# Patient Record
Sex: FEMALE | Race: WHITE | Employment: OTHER | URBAN - METROPOLITAN AREA
[De-identification: names, ages, dates, MRNs, and addresses within clinical notes are randomized per-mention and may not be internally consistent; named-entity substitution may affect disease eponyms.]

---

## 2021-11-19 ENCOUNTER — NURSE NAVIGATOR ENCOUNTER (OUTPATIENT)
Dept: HEMATOLOGY/ONCOLOGY | Facility: HOSPITAL | Age: 71
End: 2021-11-19

## 2021-11-19 NOTE — PROGRESS NOTES
Lung     Patient called, she had a CT 11/19 that showed a nodule in her lung, she and a repeat 11/21 that showed GGO in RLL that was increased in size from 0.6 cm to 1.1 cm.      Her  was treated by Dr Temi More for his cancer and they want to be seen an

## 2021-11-29 ENCOUNTER — TELEPHONE (OUTPATIENT)
Dept: HEMATOLOGY/ONCOLOGY | Facility: HOSPITAL | Age: 71
End: 2021-11-29

## 2021-12-01 ENCOUNTER — TELEPHONE (OUTPATIENT)
Dept: HEMATOLOGY/ONCOLOGY | Facility: HOSPITAL | Age: 71
End: 2021-12-01

## 2021-12-01 ENCOUNTER — APPOINTMENT (OUTPATIENT)
Dept: HEMATOLOGY/ONCOLOGY | Facility: HOSPITAL | Age: 71
End: 2021-12-01
Attending: INTERNAL MEDICINE
Payer: MEDICARE

## 2021-12-01 DIAGNOSIS — R91.8 GROUND GLASS OPACITY PRESENT ON IMAGING OF LUNG: Primary | ICD-10-CM

## 2021-12-01 NOTE — TELEPHONE ENCOUNTER
Imaging reviewed at lung conference. Recommend biopsy of growing RLL GGO. PET will not be helpful as not likely to be positive. She verbalized understanding. Will arrange through IR.

## 2021-12-02 RX ORDER — MELOXICAM 15 MG/1
7.5 TABLET ORAL DAILY
COMMUNITY

## 2021-12-02 RX ORDER — ACETAMINOPHEN 500 MG
500 TABLET ORAL EVERY 4 HOURS PRN
COMMUNITY

## 2021-12-02 RX ORDER — PANTOPRAZOLE SODIUM 40 MG/1
40 TABLET, DELAYED RELEASE ORAL
COMMUNITY
End: 2021-12-22

## 2021-12-02 RX ORDER — ATENOLOL 25 MG/1
25 TABLET ORAL DAILY
COMMUNITY

## 2021-12-02 RX ORDER — GARLIC EXTRACT 500 MG
1 CAPSULE ORAL DAILY
COMMUNITY

## 2021-12-02 RX ORDER — ALBUTEROL SULFATE 90 UG/1
AEROSOL, METERED RESPIRATORY (INHALATION) EVERY 6 HOURS PRN
COMMUNITY

## 2021-12-02 RX ORDER — FOLIC ACID 1 MG/1
TABLET ORAL DAILY
COMMUNITY

## 2021-12-02 RX ORDER — ALPRAZOLAM 0.25 MG/1
0.25 TABLET ORAL 4 TIMES DAILY PRN
COMMUNITY

## 2021-12-02 RX ORDER — PRAVASTATIN SODIUM 40 MG
40 TABLET ORAL NIGHTLY
COMMUNITY

## 2021-12-06 ENCOUNTER — TELEPHONE (OUTPATIENT)
Dept: HEMATOLOGY/ONCOLOGY | Facility: HOSPITAL | Age: 71
End: 2021-12-06

## 2021-12-06 NOTE — TELEPHONE ENCOUNTER
Belen Huddleston from radiology called and said that Lori Fuentes ordered a lung biopsy for patient, however Hilary/Radiology said that the doctor did not do a history and physical. The appointment for the biopsy is at 10:00 a.m.and the [place] is at 2:00 p.m.

## 2021-12-06 NOTE — IMAGING NOTE
Called primary, Dose, Ami NP's office, pt needs History and physical done before procedure on 12/08/21, spoke with Nurse Krysta from office. Awaiting response.

## 2021-12-08 ENCOUNTER — HOSPITAL ENCOUNTER (OUTPATIENT)
Dept: GENERAL RADIOLOGY | Facility: HOSPITAL | Age: 71
Discharge: HOME | End: 2021-12-08
Attending: RADIOLOGY
Payer: MEDICARE

## 2021-12-08 ENCOUNTER — HOSPITAL ENCOUNTER (OUTPATIENT)
Dept: CT IMAGING | Facility: HOSPITAL | Age: 71
Discharge: HOME | End: 2021-12-08
Attending: INTERNAL MEDICINE
Payer: MEDICARE

## 2021-12-08 ENCOUNTER — LAB ENCOUNTER (OUTPATIENT)
Dept: LAB | Facility: HOSPITAL | Age: 71
End: 2021-12-08
Attending: INTERNAL MEDICINE
Payer: MEDICARE

## 2021-12-08 VITALS
TEMPERATURE: 97 F | OXYGEN SATURATION: 98 % | BODY MASS INDEX: 34.07 KG/M2 | RESPIRATION RATE: 16 BRPM | WEIGHT: 212 LBS | DIASTOLIC BLOOD PRESSURE: 69 MMHG | HEART RATE: 63 BPM | HEIGHT: 66 IN | SYSTOLIC BLOOD PRESSURE: 116 MMHG

## 2021-12-08 DIAGNOSIS — R91.8 GROUND GLASS OPACITY PRESENT ON IMAGING OF LUNG: ICD-10-CM

## 2021-12-08 DIAGNOSIS — R91.8 GROUND GLASS OPACITY PRESENT ON IMAGING OF LUNG: Primary | ICD-10-CM

## 2021-12-08 PROCEDURE — 87116 MYCOBACTERIA CULTURE: CPT | Performed by: INTERNAL MEDICINE

## 2021-12-08 PROCEDURE — 87070 CULTURE OTHR SPECIMN AEROBIC: CPT | Performed by: INTERNAL MEDICINE

## 2021-12-08 PROCEDURE — 99152 MOD SED SAME PHYS/QHP 5/>YRS: CPT | Performed by: INTERNAL MEDICINE

## 2021-12-08 PROCEDURE — 87205 SMEAR GRAM STAIN: CPT | Performed by: INTERNAL MEDICINE

## 2021-12-08 PROCEDURE — 85027 COMPLETE CBC AUTOMATED: CPT | Performed by: RADIOLOGY

## 2021-12-08 PROCEDURE — 87206 SMEAR FLUORESCENT/ACID STAI: CPT | Performed by: INTERNAL MEDICINE

## 2021-12-08 PROCEDURE — 87075 CULTR BACTERIA EXCEPT BLOOD: CPT | Performed by: INTERNAL MEDICINE

## 2021-12-08 PROCEDURE — 87102 FUNGUS ISOLATION CULTURE: CPT | Performed by: INTERNAL MEDICINE

## 2021-12-08 PROCEDURE — 85610 PROTHROMBIN TIME: CPT

## 2021-12-08 PROCEDURE — 88321 CONSLTJ&REPRT SLD PREP ELSWR: CPT | Performed by: INTERNAL MEDICINE

## 2021-12-08 PROCEDURE — 87176 TISSUE HOMOGENIZATION CULTR: CPT | Performed by: INTERNAL MEDICINE

## 2021-12-08 PROCEDURE — 71045 X-RAY EXAM CHEST 1 VIEW: CPT | Performed by: RADIOLOGY

## 2021-12-08 PROCEDURE — 32408 CORE NDL BX LNG/MED PERQ: CPT | Performed by: INTERNAL MEDICINE

## 2021-12-08 PROCEDURE — 36415 COLL VENOUS BLD VENIPUNCTURE: CPT

## 2021-12-08 PROCEDURE — 88305 TISSUE EXAM BY PATHOLOGIST: CPT | Performed by: INTERNAL MEDICINE

## 2021-12-08 RX ORDER — MIDAZOLAM HYDROCHLORIDE 1 MG/ML
1 INJECTION INTRAMUSCULAR; INTRAVENOUS EVERY 5 MIN PRN
Status: DISCONTINUED | OUTPATIENT
Start: 2021-12-08 | End: 2021-12-10

## 2021-12-08 RX ORDER — NALOXONE HYDROCHLORIDE 0.4 MG/ML
80 INJECTION, SOLUTION INTRAMUSCULAR; INTRAVENOUS; SUBCUTANEOUS AS NEEDED
Status: DISCONTINUED | OUTPATIENT
Start: 2021-12-08 | End: 2021-12-10

## 2021-12-08 RX ORDER — MIDAZOLAM HYDROCHLORIDE 1 MG/ML
INJECTION INTRAMUSCULAR; INTRAVENOUS
Status: COMPLETED
Start: 2021-12-08 | End: 2021-12-08

## 2021-12-08 RX ORDER — SODIUM CHLORIDE 9 MG/ML
INJECTION, SOLUTION INTRAVENOUS CONTINUOUS
Status: DISCONTINUED | OUTPATIENT
Start: 2021-12-08 | End: 2021-12-10

## 2021-12-08 RX ORDER — FLUMAZENIL 0.1 MG/ML
0.2 INJECTION, SOLUTION INTRAVENOUS AS NEEDED
Status: DISCONTINUED | OUTPATIENT
Start: 2021-12-08 | End: 2021-12-10

## 2021-12-08 RX ADMIN — SODIUM CHLORIDE: 9 INJECTION, SOLUTION INTRAVENOUS at 10:37:00

## 2021-12-08 RX ADMIN — MIDAZOLAM HYDROCHLORIDE 1 MG: 1 INJECTION INTRAMUSCULAR; INTRAVENOUS at 11:17:00

## 2021-12-08 NOTE — PROCEDURES
[facility]  Procedure Note    Darrius Maria Patient Status:  Outpatient    10/8/1950 MRN [id number]   [facility] CT Attending Kuldeep Troy MD   Hosp Day # 0 PCP Ami M Dose     Procedure: CT guided lung biopsy R    Pre-Proce

## 2021-12-08 NOTE — H&P
[place] Patient Status:  Outpatient    10/8/1950 MRN [id number]   [facility] CT Attending Jett Blair MD   Hosp Day # 0 PCP Ami M Dose     Admitting Diagnosis:   GGO lung on R    Hi mg by mouth every morning before breakfast., Disp: , Rfl:   •  ALPRAZolam 0.25 MG Oral Tab, Take 0.25 mg by mouth 4 (four) times daily as needed. , Disp: , Rfl:     Physical Exam & Review of Systems:   Physical Exam:    /79 (BP Location: Left arm)   P

## 2021-12-08 NOTE — IMAGING NOTE
[employer], name, date of birth and allergies verified with pt. Pt here for R lung biopsy. States NPO since before midnight. Pre procedure vitals checked. IV access established to myself. saline lock. 0.9 NS IV initiated to maintain patency.   Inf

## 2021-12-09 ENCOUNTER — TELEPHONE (OUTPATIENT)
Dept: HEMATOLOGY/ONCOLOGY | Facility: HOSPITAL | Age: 71
End: 2021-12-09

## 2021-12-09 NOTE — TELEPHONE ENCOUNTER
Patient calling and missed her consult yesterday 12/8/2021   As she was still at the Hospital  Getting the Biopsy. Asking when she ca come in the that visit. Also asking if it will be Dr Virgil Orr who will be calling her with the Biopsy results.

## 2021-12-10 ENCOUNTER — TELEPHONE (OUTPATIENT)
Dept: HEMATOLOGY/ONCOLOGY | Facility: HOSPITAL | Age: 71
End: 2021-12-10

## 2021-12-10 NOTE — TELEPHONE ENCOUNTER
Patient calling to see if the biopsy results had come back.    If and when they do  Please call her at her home number

## 2021-12-13 ENCOUNTER — TELEPHONE (OUTPATIENT)
Dept: HEMATOLOGY/ONCOLOGY | Facility: HOSPITAL | Age: 71
End: 2021-12-13

## 2021-12-15 ENCOUNTER — APPOINTMENT (OUTPATIENT)
Dept: HEMATOLOGY/ONCOLOGY | Facility: HOSPITAL | Age: 71
End: 2021-12-15
Attending: INTERNAL MEDICINE
Payer: MEDICARE

## 2021-12-22 ENCOUNTER — OFFICE VISIT (OUTPATIENT)
Dept: HEMATOLOGY/ONCOLOGY | Facility: HOSPITAL | Age: 71
End: 2021-12-22
Attending: INTERNAL MEDICINE
Payer: MEDICARE

## 2021-12-22 ENCOUNTER — TELEPHONE (OUTPATIENT)
Dept: HEMATOLOGY/ONCOLOGY | Facility: HOSPITAL | Age: 71
End: 2021-12-22

## 2021-12-22 VITALS
OXYGEN SATURATION: 97 % | SYSTOLIC BLOOD PRESSURE: 157 MMHG | RESPIRATION RATE: 18 BRPM | HEART RATE: 62 BPM | WEIGHT: 212.38 LBS | HEIGHT: 64.06 IN | BODY MASS INDEX: 36.26 KG/M2 | DIASTOLIC BLOOD PRESSURE: 77 MMHG | TEMPERATURE: 97 F

## 2021-12-22 DIAGNOSIS — R05.3 CHRONIC COUGH: ICD-10-CM

## 2021-12-22 DIAGNOSIS — C34.91 BRONCHOGENIC CANCER OF RIGHT LUNG (HCC): Primary | ICD-10-CM

## 2021-12-22 DIAGNOSIS — C34.82 MALIGNANT NEOPLASM OF OVERLAPPING SITES OF LEFT BRONCHUS AND LUNG (HCC): ICD-10-CM

## 2021-12-22 PROCEDURE — 99205 OFFICE O/P NEW HI 60 MIN: CPT | Performed by: INTERNAL MEDICINE

## 2021-12-22 RX ORDER — LANSOPRAZOLE 30 MG/1
15 TABLET, ORALLY DISINTEGRATING, DELAYED RELEASE ORAL DAILY
COMMUNITY

## 2021-12-22 NOTE — PATIENT INSTRUCTIONS
For triage nurse: [phone] Monday through Friday 7:30-5:00.  *Please note this is a new phone number*    After hours or weekends for emergent needs:  [phone].      To schedule diagnostic testing: Central Scheduling: [place] [id number]

## 2021-12-22 NOTE — CONSULTS
Cancer Center Report of Consultation    Patient Name: Joshua Egan   YOB: 1950   Medical Record Number: [id number]   CSN: [id number]   Consulting Physician: Claudia Beauchamp MD  Referring Physician(s): Linda Muñoz    Date of eats and active. Denies chest or abdominal pain, change in  bowel or urinary habits, headaches, dizziness or visual symptoms. No hoarseness or hemoptysis.        Case Report   Surgical Pathology                                Case: [id number]                 right lower lobe on chest CT in November of 2019. A repeat chest CT performed on November of 2021 showed a ground-glass opacity in right upper lobe that had increased in size from 0.6 cm to 1.1 cm.  She subsequently underwent biopsy of said lesion.     Sect Surgical History:  Past Surgical History:   Procedure Laterality Date   • BREAST BIOPSY     • OTHER      carpal tunnel   • TOTAL KNEE REPLACEMENT         Family Medical History:  Family History   Problem Relation Age of Onset   • Cancer Brother    • Cancer mouth daily. , Disp: , Rfl:   •  albuterol 108 (90 Base) MCG/ACT Inhalation Aero Soln, Inhale into the lungs every 6 (six) hours as needed for Wheezing., Disp: , Rfl:   •  Meloxicam 15 MG Oral Tab, Take 7.5 mg by mouth daily. , Disp: , Rfl:   •  atenolol 25 demonstrate stability from the November 2019 prior. Stability suggests benignity. 2. There is, however, a groundglass nodule in the superior segment of the right lower lobe which is increased from prior, currently 1.1 cm in size, previously 0.6 cm.  Valery Gutiérrez partially included and noted on previous. Atherosclerosis of the abdominal aorta. Bones: No destructive lesions. Degenerative changes of the spine.    .    Procedure Note    Jimmie Nettles MD - 11/18/2021   Formatting of this note might be di benignity. 2. There is, however, a groundglass nodule in the superior segment of the right lower lobe which is increased from prior, currently 1.1 cm in size, previously 0.6 cm. This is indeterminate.  Possibility for indolent neoplasm such as bronchoal with possible prior outside imaging is recommended, if available. Otherwise, as a precaution, follow-up chest CT in one year is recommended to monitor this finding.     2.  No evidence of acute intrathoracic process.  No specific findings on this study to a 2.  No evidence of acute intrathoracic process. No specific findings on this study to account for the patient's symptoms.   Specimen Collected: 11/30/19  1:14 PM Last Resulted: 11/30/19  4:44 PM   Received From: [facility]  Result Received: 12/01/21

## 2021-12-30 ENCOUNTER — TELEPHONE (OUTPATIENT)
Dept: HEMATOLOGY/ONCOLOGY | Facility: HOSPITAL | Age: 71
End: 2021-12-30

## 2021-12-30 NOTE — TELEPHONE ENCOUNTER
Spoke with pt she is concerned that the surgeon will not see her without PFT being completed. Pt also called [facility] and they are also not completing PFTs at this time. Explained to pt to complete PET scan and will notify results.  Explained to pt that its p

## 2021-12-30 NOTE — TELEPHONE ENCOUNTER
Patient calling says her PF test was canceled. She does not know what to do.  Please reach out to her. Jamie Wilson

## 2021-12-30 NOTE — TELEPHONE ENCOUNTER
Corinna from [facility] called to inform Dr. Ankita Milner that all pulmonary functions test/procedures are hold for now. Patient has been advised. Please reach out to the patient to advise next step.  Thank you

## 2022-01-11 ENCOUNTER — LAB ENCOUNTER (OUTPATIENT)
Dept: LAB | Facility: HOSPITAL | Age: 72
End: 2022-01-11
Attending: INTERNAL MEDICINE
Payer: MEDICARE

## 2022-01-11 DIAGNOSIS — R91.8 GROUND GLASS OPACITY PRESENT ON IMAGING OF LUNG: ICD-10-CM

## 2022-01-12 LAB — SARS-COV-2 RNA RESP QL NAA+PROBE: NOT DETECTED

## 2022-01-13 ENCOUNTER — RT VISIT (OUTPATIENT)
Dept: RESPIRATORY THERAPY | Facility: HOSPITAL | Age: 72
End: 2022-01-13
Attending: INTERNAL MEDICINE
Payer: MEDICARE

## 2022-01-13 ENCOUNTER — HOSPITAL ENCOUNTER (OUTPATIENT)
Dept: NUCLEAR MEDICINE | Facility: HOSPITAL | Age: 72
Discharge: HOME | End: 2022-01-13
Attending: INTERNAL MEDICINE
Payer: MEDICARE

## 2022-01-13 DIAGNOSIS — C34.91 BRONCHOGENIC CANCER OF RIGHT LUNG (HCC): ICD-10-CM

## 2022-01-13 DIAGNOSIS — C34.82 MALIGNANT NEOPLASM OF OVERLAPPING SITES OF LEFT BRONCHUS AND LUNG (HCC): ICD-10-CM

## 2022-01-13 LAB — GLUCOSE BLD-MCNC: 89 MG/DL (ref 70–99)

## 2022-01-13 PROCEDURE — 94010 BREATHING CAPACITY TEST: CPT

## 2022-01-13 PROCEDURE — 94726 PLETHYSMOGRAPHY LUNG VOLUMES: CPT

## 2022-01-13 PROCEDURE — 82962 GLUCOSE BLOOD TEST: CPT

## 2022-01-13 PROCEDURE — 94729 DIFFUSING CAPACITY: CPT

## 2022-01-13 PROCEDURE — 78815 PET IMAGE W/CT SKULL-THIGH: CPT | Performed by: INTERNAL MEDICINE

## 2022-01-23 NOTE — PROCEDURES
Findings:  FEV1 is 1.94L, 90% predicted. FVC is 2.47L, 89% predicted. FEV1/ FVC ratio is 0.79. The flow-volume loop demonstrates a normal pattern. The TLC is 4.98L, 101% predicted. The residual volume 2.46L, 118% predicted.   The diffusion capacity is

## 2022-01-26 ENCOUNTER — EKG ENCOUNTER (OUTPATIENT)
Dept: LAB | Facility: HOSPITAL | Age: 72
End: 2022-01-26
Attending: STUDENT IN AN ORGANIZED HEALTH CARE EDUCATION/TRAINING PROGRAM
Payer: MEDICARE

## 2022-01-26 ENCOUNTER — OFFICE VISIT (OUTPATIENT)
Dept: HEMATOLOGY/ONCOLOGY | Facility: HOSPITAL | Age: 72
End: 2022-01-26
Attending: OBSTETRICS & GYNECOLOGY
Payer: MEDICARE

## 2022-01-26 VITALS
BODY MASS INDEX: 36.19 KG/M2 | DIASTOLIC BLOOD PRESSURE: 78 MMHG | RESPIRATION RATE: 18 BRPM | HEIGHT: 64.06 IN | TEMPERATURE: 98 F | SYSTOLIC BLOOD PRESSURE: 137 MMHG | HEART RATE: 67 BPM | WEIGHT: 212 LBS | OXYGEN SATURATION: 96 %

## 2022-01-26 DIAGNOSIS — R91.1 NODULE OF LOWER LOBE OF RIGHT LUNG: ICD-10-CM

## 2022-01-26 DIAGNOSIS — R91.1 NODULE OF LOWER LOBE OF RIGHT LUNG: Primary | ICD-10-CM

## 2022-01-26 LAB
ANION GAP SERPL CALC-SCNC: 6 MMOL/L (ref 0–18)
ANTIBODY SCREEN: NEGATIVE
BASOPHILS # BLD AUTO: 0.07 X10(3) UL (ref 0–0.2)
BASOPHILS NFR BLD AUTO: 0.9 %
BUN BLD-MCNC: 16 MG/DL (ref 7–18)
CALCIUM BLD-MCNC: 9.3 MG/DL (ref 8.5–10.1)
CHLORIDE SERPL-SCNC: 108 MMOL/L (ref 98–112)
CO2 SERPL-SCNC: 26 MMOL/L (ref 21–32)
CREAT BLD-MCNC: 0.57 MG/DL
EOSINOPHIL # BLD AUTO: 0.1 X10(3) UL (ref 0–0.7)
EOSINOPHIL NFR BLD AUTO: 1.2 %
ERYTHROCYTE [DISTWIDTH] IN BLOOD BY AUTOMATED COUNT: 13.5 %
FASTING STATUS PATIENT QL REPORTED: NO
GLUCOSE BLD-MCNC: 95 MG/DL (ref 70–99)
HCT VFR BLD AUTO: 42.2 %
HGB BLD-MCNC: 13.7 G/DL
IMM GRANULOCYTES # BLD AUTO: 0.03 X10(3) UL (ref 0–1)
IMM GRANULOCYTES NFR BLD: 0.4 %
LYMPHOCYTES # BLD AUTO: 2.49 X10(3) UL (ref 1–4)
LYMPHOCYTES NFR BLD AUTO: 30.7 %
MCH RBC QN AUTO: 28.4 PG (ref 26–34)
MCHC RBC AUTO-ENTMCNC: 32.5 G/DL (ref 31–37)
MCV RBC AUTO: 87.4 FL
MONOCYTES # BLD AUTO: 0.55 X10(3) UL (ref 0.1–1)
NEUTROPHILS # BLD AUTO: 4.86 X10 (3) UL (ref 1.5–7.7)
NEUTROPHILS # BLD AUTO: 4.86 X10(3) UL (ref 1.5–7.7)
NEUTROPHILS NFR BLD AUTO: 60 %
OSMOLALITY SERPL CALC.SUM OF ELEC: 291 MOSM/KG (ref 275–295)
PLATELET # BLD AUTO: 274 10(3)UL (ref 150–450)
POTASSIUM SERPL-SCNC: 4.1 MMOL/L (ref 3.5–5.1)
RBC # BLD AUTO: 4.83 X10(6)UL
RH BLOOD TYPE: NEGATIVE
SODIUM SERPL-SCNC: 140 MMOL/L (ref 136–145)
WBC # BLD AUTO: 8.1 X10(3) UL (ref 4–11)

## 2022-01-26 PROCEDURE — 93005 ELECTROCARDIOGRAM TRACING: CPT

## 2022-01-26 PROCEDURE — 86901 BLOOD TYPING SEROLOGIC RH(D): CPT

## 2022-01-26 PROCEDURE — 36415 COLL VENOUS BLD VENIPUNCTURE: CPT

## 2022-01-26 PROCEDURE — 85025 COMPLETE CBC W/AUTO DIFF WBC: CPT

## 2022-01-26 PROCEDURE — 80048 BASIC METABOLIC PNL TOTAL CA: CPT

## 2022-01-26 PROCEDURE — 86900 BLOOD TYPING SEROLOGIC ABO: CPT

## 2022-01-26 PROCEDURE — 99211 OFF/OP EST MAY X REQ PHY/QHP: CPT

## 2022-01-26 PROCEDURE — 86850 RBC ANTIBODY SCREEN: CPT

## 2022-01-26 PROCEDURE — 93010 ELECTROCARDIOGRAM REPORT: CPT | Performed by: INTERNAL MEDICINE

## 2022-01-27 NOTE — CONSULTS
Thoracic Surgery H&P     Name: Nickie Ryan   Age: 66year old   Sex: female.    MRN: [id number]    Reason for Consultation: right lower lobe ground glass nodule     Consulting Physician: Dr. Lott Has    Subjective:     Chief Complaint: \" They biop BIOPSY     • OTHER      carpal tunnel   • TOTAL KNEE REPLACEMENT         Social History: Social History    Socioeconomic History      Marital status:       Spouse name: Not on file      Number of children: Not on file      Years of education: Not on Disp: , Rfl:   atenolol 25 MG Oral Tab, Take 25 mg by mouth daily. , Disp: , Rfl:   pravastatin 40 MG Oral Tab, Take 40 mg by mouth nightly., Disp: , Rfl:   PYRIDOXINE HCL OR, Take by mouth every morning., Disp: , Rfl:   folic acid 1 MG Oral Tab, Take by mo 1/13/22:   FINDINGS:     Head and neck:     There is symmetric increased FDG accumulation within the bilateral submandibular glands.  Maximum SUV on the left is 4.01 and maximum SUV on the right is 3.72.  A discrete submandibular gland mass is not identifi degenerative and arthritic change.         CT Biopsy 12/8/21:   Lung, right, core needle biopsy ([id number]; 12/08/2021): Atypical alveolar proliferation, favor well differentiated adenocarcinoma (see Comment).     PFT 1/13/22: FEV1 90%, DLCO 103%    Assessm situ.  I described for Ms. Osborne a minimally invasive wedge resection and mediastinal lymph node dissection. Her pulmonary function tests suggest that she has adequate lung function to undergo the proposed surgery.   I went over the alternatives (surveillan

## 2022-01-28 LAB
ATRIAL RATE: 65 BPM
P AXIS: 8 DEGREES
P-R INTERVAL: 178 MS
Q-T INTERVAL: 416 MS
QRS DURATION: 72 MS
QTC CALCULATION (BEZET): 432 MS
R AXIS: -13 DEGREES
T AXIS: 20 DEGREES
VENTRICULAR RATE: 65 BPM

## 2022-02-11 ENCOUNTER — LAB ENCOUNTER (OUTPATIENT)
Dept: LAB | Age: 72
DRG: 164 | End: 2022-02-11
Attending: STUDENT IN AN ORGANIZED HEALTH CARE EDUCATION/TRAINING PROGRAM
Payer: MEDICARE

## 2022-02-11 DIAGNOSIS — R91.1 NODULE OF LOWER LOBE OF RIGHT LUNG: ICD-10-CM

## 2022-02-12 LAB — SARS-COV-2 RNA RESP QL NAA+PROBE: NOT DETECTED

## 2022-02-14 ENCOUNTER — ANESTHESIA (OUTPATIENT)
Dept: CARDIAC SURGERY | Facility: HOSPITAL | Age: 72
DRG: 164 | End: 2022-02-14
Payer: MEDICARE

## 2022-02-14 ENCOUNTER — ANESTHESIA EVENT (OUTPATIENT)
Dept: CARDIAC SURGERY | Facility: HOSPITAL | Age: 72
DRG: 164 | End: 2022-02-14
Payer: MEDICARE

## 2022-02-14 ENCOUNTER — HOSPITAL ENCOUNTER (INPATIENT)
Facility: HOSPITAL | Age: 72
LOS: 2 days | Discharge: HOME | DRG: 164 | End: 2022-02-16
Attending: THORACIC SURGERY (CARDIOTHORACIC VASCULAR SURGERY) | Admitting: THORACIC SURGERY (CARDIOTHORACIC VASCULAR SURGERY)
Payer: MEDICARE

## 2022-02-14 ENCOUNTER — HOSPITAL ENCOUNTER (OUTPATIENT)
Dept: CT IMAGING | Facility: HOSPITAL | Age: 72
Discharge: HOME | DRG: 164 | End: 2022-02-14
Attending: STUDENT IN AN ORGANIZED HEALTH CARE EDUCATION/TRAINING PROGRAM
Payer: MEDICARE

## 2022-02-14 DIAGNOSIS — Z20.822 ENCOUNTER FOR PREOPERATIVE SCREENING LABORATORY TESTING FOR COVID-19 VIRUS: Primary | ICD-10-CM

## 2022-02-14 DIAGNOSIS — R91.1 NODULE OF LOWER LOBE OF RIGHT LUNG: ICD-10-CM

## 2022-02-14 DIAGNOSIS — G47.33 OBSTRUCTIVE SLEEP APNEA SYNDROME: ICD-10-CM

## 2022-02-14 DIAGNOSIS — Z01.812 ENCOUNTER FOR PREOPERATIVE SCREENING LABORATORY TESTING FOR COVID-19 VIRUS: Primary | ICD-10-CM

## 2022-02-14 LAB
BASE EXCESS BLD CALC-SCNC: 2 MMOL/L
CA-I BLD-SCNC: 1.22 MMOL/L (ref 1.12–1.32)
CO2 BLD-SCNC: 29 MMOL/L (ref 22–32)
GLUCOSE BLD-MCNC: 99 MG/DL (ref 70–99)
HCO3 BLD-SCNC: 27.6 MEQ/L
HCT VFR BLD CALC: 35 %
ISTAT ACTIVATED CLOTTING TIME: 136 SECONDS (ref 74–137)
PCO2 BLD: 52.1 MMHG
PH BLD: 7.33 [PH]
PO2 BLD: 371 MMHG
POTASSIUM BLD-SCNC: 3.6 MMOL/L (ref 3.6–5.1)
SAO2 % BLD: 100 %
SODIUM BLD-SCNC: 142 MMOL/L (ref 136–145)

## 2022-02-14 PROCEDURE — 99222 1ST HOSP IP/OBS MODERATE 55: CPT | Performed by: HOSPITALIST

## 2022-02-14 PROCEDURE — 07B74ZX EXCISION OF THORAX LYMPHATIC, PERCUTANEOUS ENDOSCOPIC APPROACH, DIAGNOSTIC: ICD-10-PCS | Performed by: THORACIC SURGERY (CARDIOTHORACIC VASCULAR SURGERY)

## 2022-02-14 PROCEDURE — 3E0T3BZ INTRODUCTION OF ANESTHETIC AGENT INTO PERIPHERAL NERVES AND PLEXI, PERCUTANEOUS APPROACH: ICD-10-PCS | Performed by: THORACIC SURGERY (CARDIOTHORACIC VASCULAR SURGERY)

## 2022-02-14 PROCEDURE — 0BBF4ZZ EXCISION OF RIGHT LOWER LUNG LOBE, PERCUTANEOUS ENDOSCOPIC APPROACH: ICD-10-PCS | Performed by: THORACIC SURGERY (CARDIOTHORACIC VASCULAR SURGERY)

## 2022-02-14 PROCEDURE — 71250 CT THORAX DX C-: CPT | Performed by: STUDENT IN AN ORGANIZED HEALTH CARE EDUCATION/TRAINING PROGRAM

## 2022-02-14 PROCEDURE — 76942 ECHO GUIDE FOR BIOPSY: CPT | Performed by: ANESTHESIOLOGY

## 2022-02-14 PROCEDURE — 0BJ08ZZ INSPECTION OF TRACHEOBRONCHIAL TREE, VIA NATURAL OR ARTIFICIAL OPENING ENDOSCOPIC: ICD-10-PCS | Performed by: THORACIC SURGERY (CARDIOTHORACIC VASCULAR SURGERY)

## 2022-02-14 RX ORDER — HYDROMORPHONE HYDROCHLORIDE 1 MG/ML
0.8 INJECTION, SOLUTION INTRAMUSCULAR; INTRAVENOUS; SUBCUTANEOUS EVERY 2 HOUR PRN
Status: DISCONTINUED | OUTPATIENT
Start: 2022-02-14 | End: 2022-02-16

## 2022-02-14 RX ORDER — METOCLOPRAMIDE HYDROCHLORIDE 5 MG/ML
INJECTION INTRAMUSCULAR; INTRAVENOUS AS NEEDED
Status: DISCONTINUED | OUTPATIENT
Start: 2022-02-14 | End: 2022-02-14 | Stop reason: SURG

## 2022-02-14 RX ORDER — HYDROMORPHONE HYDROCHLORIDE 1 MG/ML
0.4 INJECTION, SOLUTION INTRAMUSCULAR; INTRAVENOUS; SUBCUTANEOUS EVERY 2 HOUR PRN
Status: DISCONTINUED | OUTPATIENT
Start: 2022-02-14 | End: 2022-02-16

## 2022-02-14 RX ORDER — CEFAZOLIN SODIUM/WATER 2 G/20 ML
SYRINGE (ML) INTRAVENOUS AS NEEDED
Status: DISCONTINUED | OUTPATIENT
Start: 2022-02-14 | End: 2022-02-14 | Stop reason: SURG

## 2022-02-14 RX ORDER — ACETAMINOPHEN 10 MG/ML
INJECTION, SOLUTION INTRAVENOUS AS NEEDED
Status: DISCONTINUED | OUTPATIENT
Start: 2022-02-14 | End: 2022-02-14 | Stop reason: SURG

## 2022-02-14 RX ORDER — ONDANSETRON 2 MG/ML
4 INJECTION INTRAMUSCULAR; INTRAVENOUS AS NEEDED
Status: DISCONTINUED | OUTPATIENT
Start: 2022-02-14 | End: 2022-02-14 | Stop reason: HOSPADM

## 2022-02-14 RX ORDER — HEPARIN SODIUM 5000 [USP'U]/ML
5000 INJECTION, SOLUTION INTRAVENOUS; SUBCUTANEOUS EVERY 8 HOURS SCHEDULED
Status: DISCONTINUED | OUTPATIENT
Start: 2022-02-14 | End: 2022-02-16

## 2022-02-14 RX ORDER — SODIUM CHLORIDE 0.9 % (FLUSH) 0.9 %
10 SYRINGE (ML) INJECTION AS NEEDED
Status: DISCONTINUED | OUTPATIENT
Start: 2022-02-14 | End: 2022-02-16

## 2022-02-14 RX ORDER — SODIUM CHLORIDE, SODIUM LACTATE, POTASSIUM CHLORIDE, CALCIUM CHLORIDE 600; 310; 30; 20 MG/100ML; MG/100ML; MG/100ML; MG/100ML
INJECTION, SOLUTION INTRAVENOUS CONTINUOUS
Status: DISCONTINUED | OUTPATIENT
Start: 2022-02-14 | End: 2022-02-14 | Stop reason: HOSPADM

## 2022-02-14 RX ORDER — EPHEDRINE SULFATE 50 MG/ML
INJECTION INTRAVENOUS AS NEEDED
Status: DISCONTINUED | OUTPATIENT
Start: 2022-02-14 | End: 2022-02-14 | Stop reason: SURG

## 2022-02-14 RX ORDER — CALCIUM CARBONATE 200(500)MG
1000 TABLET,CHEWABLE ORAL 3 TIMES DAILY PRN
Status: DISCONTINUED | OUTPATIENT
Start: 2022-02-14 | End: 2022-02-16

## 2022-02-14 RX ORDER — ONDANSETRON 2 MG/ML
4 INJECTION INTRAMUSCULAR; INTRAVENOUS EVERY 6 HOURS PRN
Status: DISCONTINUED | OUTPATIENT
Start: 2022-02-14 | End: 2022-02-16

## 2022-02-14 RX ORDER — FOLIC ACID 1 MG/1
1 TABLET ORAL DAILY
Status: DISCONTINUED | OUTPATIENT
Start: 2022-02-15 | End: 2022-02-16

## 2022-02-14 RX ORDER — SODIUM CHLORIDE, SODIUM LACTATE, POTASSIUM CHLORIDE, CALCIUM CHLORIDE 600; 310; 30; 20 MG/100ML; MG/100ML; MG/100ML; MG/100ML
INJECTION, SOLUTION INTRAVENOUS CONTINUOUS PRN
Status: DISCONTINUED | OUTPATIENT
Start: 2022-02-14 | End: 2022-02-14 | Stop reason: SURG

## 2022-02-14 RX ORDER — HYDROMORPHONE HYDROCHLORIDE 1 MG/ML
1.2 INJECTION, SOLUTION INTRAMUSCULAR; INTRAVENOUS; SUBCUTANEOUS EVERY 2 HOUR PRN
Status: DISCONTINUED | OUTPATIENT
Start: 2022-02-14 | End: 2022-02-16

## 2022-02-14 RX ORDER — ATORVASTATIN CALCIUM 10 MG/1
10 TABLET, FILM COATED ORAL NIGHTLY
Status: DISCONTINUED | OUTPATIENT
Start: 2022-02-14 | End: 2022-02-16

## 2022-02-14 RX ORDER — OXYCODONE HYDROCHLORIDE 5 MG/1
5 TABLET ORAL EVERY 4 HOURS PRN
Status: DISCONTINUED | OUTPATIENT
Start: 2022-02-14 | End: 2022-02-16

## 2022-02-14 RX ORDER — SODIUM CHLORIDE 9 MG/ML
INJECTION, SOLUTION INTRAVENOUS CONTINUOUS PRN
Status: DISCONTINUED | OUTPATIENT
Start: 2022-02-14 | End: 2022-02-14 | Stop reason: SURG

## 2022-02-14 RX ORDER — HYDROMORPHONE HYDROCHLORIDE 1 MG/ML
INJECTION, SOLUTION INTRAMUSCULAR; INTRAVENOUS; SUBCUTANEOUS
Status: COMPLETED
Start: 2022-02-14 | End: 2022-02-14

## 2022-02-14 RX ORDER — DIPHENHYDRAMINE HYDROCHLORIDE 50 MG/ML
12.5 INJECTION INTRAMUSCULAR; INTRAVENOUS AS NEEDED
Status: DISCONTINUED | OUTPATIENT
Start: 2022-02-14 | End: 2022-02-14 | Stop reason: HOSPADM

## 2022-02-14 RX ORDER — BISACODYL 10 MG
10 SUPPOSITORY, RECTAL RECTAL
Status: DISCONTINUED | OUTPATIENT
Start: 2022-02-14 | End: 2022-02-16

## 2022-02-14 RX ORDER — ALPRAZOLAM 0.25 MG/1
0.25 TABLET ORAL 4 TIMES DAILY PRN
Status: DISCONTINUED | OUTPATIENT
Start: 2022-02-14 | End: 2022-02-16

## 2022-02-14 RX ORDER — MIDAZOLAM HYDROCHLORIDE 1 MG/ML
1 INJECTION INTRAMUSCULAR; INTRAVENOUS EVERY 5 MIN PRN
Status: DISCONTINUED | OUTPATIENT
Start: 2022-02-14 | End: 2022-02-14 | Stop reason: HOSPADM

## 2022-02-14 RX ORDER — HYDROMORPHONE HYDROCHLORIDE 1 MG/ML
0.4 INJECTION, SOLUTION INTRAMUSCULAR; INTRAVENOUS; SUBCUTANEOUS EVERY 5 MIN PRN
Status: DISCONTINUED | OUTPATIENT
Start: 2022-02-14 | End: 2022-02-14 | Stop reason: HOSPADM

## 2022-02-14 RX ORDER — METOCLOPRAMIDE HYDROCHLORIDE 5 MG/ML
10 INJECTION INTRAMUSCULAR; INTRAVENOUS AS NEEDED
Status: DISCONTINUED | OUTPATIENT
Start: 2022-02-14 | End: 2022-02-14 | Stop reason: HOSPADM

## 2022-02-14 RX ORDER — ACETAMINOPHEN 10 MG/ML
1000 INJECTION, SOLUTION INTRAVENOUS EVERY 6 HOURS
Status: COMPLETED | OUTPATIENT
Start: 2022-02-14 | End: 2022-02-16

## 2022-02-14 RX ORDER — MEPERIDINE HYDROCHLORIDE 25 MG/ML
12.5 INJECTION INTRAMUSCULAR; INTRAVENOUS; SUBCUTANEOUS AS NEEDED
Status: DISCONTINUED | OUTPATIENT
Start: 2022-02-14 | End: 2022-02-14 | Stop reason: HOSPADM

## 2022-02-14 RX ORDER — KETOROLAC TROMETHAMINE 30 MG/ML
15 INJECTION, SOLUTION INTRAMUSCULAR; INTRAVENOUS EVERY 6 HOURS
Status: DISCONTINUED | OUTPATIENT
Start: 2022-02-14 | End: 2022-02-16

## 2022-02-14 RX ORDER — GUAIFENESIN 600 MG
600 TABLET, EXTENDED RELEASE 12 HR ORAL 2 TIMES DAILY
Status: DISCONTINUED | OUTPATIENT
Start: 2022-02-14 | End: 2022-02-16

## 2022-02-14 RX ORDER — SODIUM CHLORIDE 9 MG/ML
INJECTION, SOLUTION INTRAVENOUS CONTINUOUS
Status: DISCONTINUED | OUTPATIENT
Start: 2022-02-14 | End: 2022-02-15

## 2022-02-14 RX ORDER — ATENOLOL 25 MG/1
25 TABLET ORAL DAILY
Status: DISCONTINUED | OUTPATIENT
Start: 2022-02-15 | End: 2022-02-16

## 2022-02-14 RX ORDER — PHENYLEPHRINE HCL 10 MG/ML
VIAL (ML) INJECTION AS NEEDED
Status: DISCONTINUED | OUTPATIENT
Start: 2022-02-14 | End: 2022-02-14 | Stop reason: SURG

## 2022-02-14 RX ORDER — ROCURONIUM BROMIDE 10 MG/ML
INJECTION, SOLUTION INTRAVENOUS AS NEEDED
Status: DISCONTINUED | OUTPATIENT
Start: 2022-02-14 | End: 2022-02-14 | Stop reason: SURG

## 2022-02-14 RX ORDER — CEFAZOLIN SODIUM/WATER 2 G/20 ML
2 SYRINGE (ML) INTRAVENOUS EVERY 8 HOURS
Status: COMPLETED | OUTPATIENT
Start: 2022-02-14 | End: 2022-02-14

## 2022-02-14 RX ORDER — ONDANSETRON 2 MG/ML
INJECTION INTRAMUSCULAR; INTRAVENOUS AS NEEDED
Status: DISCONTINUED | OUTPATIENT
Start: 2022-02-14 | End: 2022-02-14 | Stop reason: SURG

## 2022-02-14 RX ORDER — IPRATROPIUM BROMIDE 21 UG/1
2 SPRAY, METERED NASAL EVERY 12 HOURS
Status: DISCONTINUED | OUTPATIENT
Start: 2022-02-14 | End: 2022-02-16

## 2022-02-14 RX ORDER — SENNOSIDES 8.6 MG
17.2 TABLET ORAL NIGHTLY PRN
Status: DISCONTINUED | OUTPATIENT
Start: 2022-02-14 | End: 2022-02-16

## 2022-02-14 RX ORDER — LABETALOL HYDROCHLORIDE 5 MG/ML
5 INJECTION, SOLUTION INTRAVENOUS EVERY 5 MIN PRN
Status: DISCONTINUED | OUTPATIENT
Start: 2022-02-14 | End: 2022-02-14 | Stop reason: HOSPADM

## 2022-02-14 RX ORDER — POLYETHYLENE GLYCOL 3350 17 G/17G
17 POWDER, FOR SOLUTION ORAL DAILY PRN
Status: DISCONTINUED | OUTPATIENT
Start: 2022-02-14 | End: 2022-02-16

## 2022-02-14 RX ORDER — KETOROLAC TROMETHAMINE 15 MG/ML
30 INJECTION, SOLUTION INTRAMUSCULAR; INTRAVENOUS
Status: COMPLETED | OUTPATIENT
Start: 2022-02-14 | End: 2022-02-14

## 2022-02-14 RX ORDER — BUPIVACAINE HYDROCHLORIDE 2.5 MG/ML
INJECTION, SOLUTION EPIDURAL; INFILTRATION; INTRACAUDAL AS NEEDED
Status: DISCONTINUED | OUTPATIENT
Start: 2022-02-14 | End: 2022-02-14 | Stop reason: HOSPADM

## 2022-02-14 RX ORDER — OXYCODONE HYDROCHLORIDE 5 MG/1
10 TABLET ORAL EVERY 4 HOURS PRN
Status: DISCONTINUED | OUTPATIENT
Start: 2022-02-14 | End: 2022-02-16

## 2022-02-14 RX ORDER — SODIUM PHOSPHATE, DIBASIC AND SODIUM PHOSPHATE, MONOBASIC 7; 19 G/133ML; G/133ML
1 ENEMA RECTAL ONCE AS NEEDED
Status: DISCONTINUED | OUTPATIENT
Start: 2022-02-14 | End: 2022-02-16

## 2022-02-14 RX ORDER — NALOXONE HYDROCHLORIDE 0.4 MG/ML
80 INJECTION, SOLUTION INTRAMUSCULAR; INTRAVENOUS; SUBCUTANEOUS AS NEEDED
Status: DISCONTINUED | OUTPATIENT
Start: 2022-02-14 | End: 2022-02-14 | Stop reason: HOSPADM

## 2022-02-14 RX ADMIN — SODIUM CHLORIDE, SODIUM LACTATE, POTASSIUM CHLORIDE, CALCIUM CHLORIDE: 600; 310; 30; 20 INJECTION, SOLUTION INTRAVENOUS at 07:04:00

## 2022-02-14 RX ADMIN — ROCURONIUM BROMIDE 80 MG: 10 INJECTION, SOLUTION INTRAVENOUS at 07:13:00

## 2022-02-14 RX ADMIN — ONDANSETRON 4 MG: 2 INJECTION INTRAMUSCULAR; INTRAVENOUS at 08:58:00

## 2022-02-14 RX ADMIN — PHENYLEPHRINE HCL 100 MCG: 10 MG/ML VIAL (ML) INJECTION at 07:48:00

## 2022-02-14 RX ADMIN — PHENYLEPHRINE HCL 100 MCG: 10 MG/ML VIAL (ML) INJECTION at 08:26:00

## 2022-02-14 RX ADMIN — CEFAZOLIN SODIUM/WATER 2 G: 2 G/20 ML SYRINGE (ML) INTRAVENOUS at 07:24:00

## 2022-02-14 RX ADMIN — ACETAMINOPHEN 1000 MG: 10 INJECTION, SOLUTION INTRAVENOUS at 08:38:00

## 2022-02-14 RX ADMIN — METOCLOPRAMIDE HYDROCHLORIDE 10 MG: 5 INJECTION INTRAMUSCULAR; INTRAVENOUS at 08:51:00

## 2022-02-14 RX ADMIN — SODIUM CHLORIDE: 9 INJECTION, SOLUTION INTRAVENOUS at 07:38:00

## 2022-02-14 RX ADMIN — EPHEDRINE SULFATE 10 MG: 50 INJECTION INTRAVENOUS at 08:26:00

## 2022-02-14 NOTE — ANESTHESIA PROCEDURE NOTES
Airway  Date/Time: 2/14/2022 7:14 AM  Urgency: elective    Airway not difficult    General Information and Staff    Patient location during procedure: OR  Anesthesiologist: Ladonna Elizondo MD  Performed: anesthesiologist     Indications and Patient Condition  Indications for airway management: anesthesia  Spontaneous Ventilation: absent  Sedation level: deep  Preoxygenated: yes  Patient position: sniffing  Mask difficulty assessment: 1 - vent by mask    Final Airway Details  Final airway type: endotracheal airway      Successful airway: ETT and ETT - double lumen left  Cuffed: yes   Successful intubation technique: direct laryngoscopy  Endotracheal tube insertion site: oral  Blade: Edwardo  Blade size: #3  ETT DL size (fr): 37    Cormack-Lehane Classification: grade I - full view of glottis  Placement verified by: chest auscultation and capnometry   Cuff volume (mL): 6  Measured from: lips  ETT to lips (cm): 25  Number of attempts at approach: 1    Additional Comments  Smooth IV induction. Easy BVM ventilation. Grade 1 view on DL with mac 3 blade. 40 Fr JOSH placed past the vocal cords and passed with the bronchial lumen placed in the L mainstem broncus. Positioned confirmed by fiberoptic scope.

## 2022-02-14 NOTE — INTERVAL H&P NOTE
Pre-op Diagnosis: NODULE OF LOWER LOBE OF RIGHT LUNG    The above referenced H&P was reviewed by Ramona Brand PA-C on 2/14/2022, the patient was examined and no significant changes have occurred in the patient's condition since the H&P was performed. We discussed with the patient and/or legal representative the potential benefits, risks and side effects of this procedure; the likelihood of the patient achieving goals; and potential problems that might occur during recuperation. We discussed reasonable alternatives to the procedure, including risks, benefits and side effects related to the alternatives and risks related to not receiving this procedure. We will proceed with procedure as planned.

## 2022-02-14 NOTE — BRIEF OP NOTE
Pre-Operative Diagnosis: NODULE OF LOWER LOBE OF RIGHT LUNG     Post-Operative Diagnosis: NODULE OF LOWER LOBE OF RIGHT LUNG      Procedure Performed:   NAVIGATIONAL BRONCHOSCOPY, RIGHT VIDEO-ASSITED THORACOSCOPIC LOWER LOBE WEDGE RESECTION, MEDIASTINAL LYMPH NODE DISSECTION    Surgeon(s) and Role:     * Mike Nix MD - Primary    Assistant(s):  PA:  Carlos A Jeter     Surgical Findings: no pleural mets     Specimen:   ID Type Source Tests Collected by Time Destination   1 : Right Lower Lobe Wedge Tissue Lung right SURGICAL PATHOLOGY TISSUE Mike Nix MD 2/14/2022 3946    2 : Right Lymph Node Level 7 Tissue Lymph node SURGICAL PATHOLOGY SOHEILA Nix MD 2/14/2022 0849    3 : Right Lymph Node Level 10 Tissue Lymph node SURGICAL PATHOLOGY SOHEILA Nix MD 2/14/2022 0850    4 : Right Lymph Node Level 11 Tissue Lymph node SURGICAL PATHOLOGY SOHEILA Nix MD 2/14/2022 0850    5 : Right Lymph Node Level 4 Tissue Lymph node SURGICAL PATHOLOGY SOHEILA Nix MD 2/14/2022 0850    6 : Right Lymph Node Level 2 Tissue Lymph node SURGICAL PATHOLOGY SOHEILA Nix MD 2/14/2022 0850           Estimated Blood Loss: Blood Output: 20 mL (2/14/2022  9:01 AM)      Disposition: PACU to 8th floor    Nicolle Nelson PA-C  2/14/2022  9:28 AM

## 2022-02-14 NOTE — PROGRESS NOTES
02/14/22 1230 02/14/22 1231   Provider Notification   Reason for Communication New consult New consult   Provider Name Zadie Lundborg, MD Angelyn German, MD;Other (comment)  (forwarded to Dr. Yi Amaya)   Method of Communication Page Page   Notification Time [id number]     Consults notified

## 2022-02-14 NOTE — PROGRESS NOTES
Patient arrived on unit from PACU around 1140 this AM. A&O x4, slightly drowsy. /dedicated care partner at bedside. Admission navigator complete. SPO2 maintained on 2L O2 post-op, will wean as tolerated to baseline of RA. Pt reports chronic productive cough (thick/clear). Right chest tube to continuous suction- no complications at site. Dressing CDI, dark sanguinous output (marked @80cc upon arrival to unit). Pt reports moderate-severe right shoulder pain, states this is chronic d/t her severe arthritis and she has been unable to take her medication pre-procedure. Scheduled IV Ofirmev & IV Toradol to be given, PRNs also available; pt educated on pain management. NSR/SB on tele. IVF infusing per orders. Heparin subcutaneous and SCDs for DVT prophylaxis. Continent of B&B, BM yesterday 2/13. Dietician to see for reports for 16lb weight loss in last year w/o explanation. Psych liaison also to see- pt stressed/sad r/t her sister & brother in laws recent deaths. Up with one assist, does not typically use assistive device. Fall precautions in place. Visitor shira explained to pt/her . Updated on POC, needs met.

## 2022-02-14 NOTE — OPERATIVE REPORT
[address]    PATIENT'S NAME: WOODROW SANCHEZ   ATTENDING PHYSICIAN: [address] Rizwana Munoz MD   OPERATING PHYSICIAN: Edith Wan. Rizwana Munoz MD   PATIENT ACCOUNT#:   [id number]    LOCATION:  [address] [place] [address]  MEDICAL RECORD #:   [id number]       YOB: 1950  ADMISSION DATE:       02/14/2022      OPERATION DATE:  02/14/2022    OPERATIVE REPORT      PREOPERATIVE DIAGNOSIS:  Right lower lobe adenocarcinoma. POSTOPERATIVE DIAGNOSIS:  Right lower lobe adenocarcinoma. PROCEDURE:    1. Navigational bronchoscopy with percutaneous marking of lung lesion. 2.   Right video-assisted thoracoscopic exploration. 3.   Right lower lobe therapeutic wedge resection. 4.   Mediastinal lymph node dissection. 5.   Multilevel intercostal nerve block. ASSISTANT:  Megan Merritt PA-C. ANESTHESIA:  General dual-lumen endotracheal anesthesia. ANESTHESIOLOGIST:  Norma Obando MD.    INDICATIONS:  The patient is a 71-year-old woman who was found to have an enlarging ground glass opacity in the right lower lobe of the lung. Needle biopsy of this lesion proved adenocarcinoma; however, no definite invasion was found. Because of this, the lesion may be in situ only. Additionally, there was no PET avidity and no sign of distant disease. After a long conversation with the patient about anatomic resection versus wedge resection, we agreed on wedge resection for this ground glass lesion. FINDINGS:  An area marked by the [facility] navigation system with methylene blue was excised in a wedge resection. OPERATIVE TECHNIQUE:  The patient was brought to the operating room, laid supine, underwent general dual-lumen endotracheal anesthesia. I performed a navigational bronchoscopy using the [employer] navigation system. I first inspected the airways. There was no sign of any endobronchial disease. I calibrated the Veran system. Once this was done, she was placed in left lateral decubitus position.   All pressure points were padded. She was prepped for percutaneous marking. I used the BufferBox functionality to inject 0.7 mL of methylene blue into the right lower lobe of the lung. She was then prepped and draped for the surgical portion of the procedure. I made a 2 cm incision in the inframammary crease, anterior axillary line into the chest bluntly. A camera through this incision revealed I was safely in the chest space. I then made 2 additional incisions which were my standard incisions for right-sided chest exploration. An Homar wound retractor was placed at the superiormost incision for removal of specimens. I then explored the right chest.  There was no sign of pleural disease. I could see blue in the lung in an area that corresponds to where I would suspect the lesion to be. I used several loads of the Ethicon 45 mm [place] stapler with a combination of gold and black to wedge out this area. The wedge included the area marked with the [employer] system. Once it was successfully wedged out, it was placed in an anchor bag and removed from the chest.  As I suspected, due to the ground glass nature of the lesion, nothing could be palpated. Satisfied that I had gotten out the area in question, I proceeded with a mediastinal lymph node dissection. I first took down the inferior pulmonary ligament and looked for level 9 lymph nodes. None were seen. Working cephalad and the lung reflected anteriorly, I found the subcarinal space and dissected out level 7 lymph nodes. A Surgicel was left at this site for additional hemostasis. I then reflected the lung posteriorly and found level 10 lymph nodes along the hilum, which were bluntly dissected free. A level 11 lymph node was found in the area bordered by the superior pulmonary vein inferiorly and the azygos vein superiorly. This level 11 lymph node was bluntly dissected free and sent for Pathology.   I then transected the pleura superior to the azygous vein and posterior to the SVC. Level 4 lymph nodes were bluntly dissected free from this area. Working cephalad, level 2 lymph nodes were found and bluntly dissected free. I left a Surgicel at this site for additional hemostasis. I then did a multilevel intercostal nerve block. I used 60 mL of 0.25% Marcaine; 5 mL was injected in each interspace 2 through 9 under direct visualization with thoracoscope for postoperative pain control. I then inspected for bleeding. None was seen. I then placed a 24-Irish chest tube posteriorly and secured it using 0 silk sutures. Finally, I asked Anesthesiology to reinflate the lung. It reinflated well and filled the chest space. I then withdrew my camera and all instruments and closed the remaining incisions in 3 layers with 2 layers of 2-0 Vicryl and 1 of 4-0 Monocryl. Patient tolerated the procedure well. I was present for the entirety of the procedure. SPECIMENS:  Right lower lobe wedge; level 2, 4, 7, 10, and 11 lymph nodes. ESTIMATED BLOOD LOSS:  20 mL. DRAINS:  A 24-Irish chest tube x1. IMPLANTS:  None. COMPLICATIONS:  None. CONDITION:  Stable, to PACU. Dictated By Ricardo Boateng MD  d: 02/14/2022 09:11:38  t: 02/14/2022 16:34:52  Job [id number]  JLL/

## 2022-02-14 NOTE — ANESTHESIA PROCEDURE NOTES
Peripheral IV  Date/Time: 2/14/2022 7:28 AM  Inserted by: Bright Ramos MD    Placement  Needle size: 18 G  Laterality: right  Location: hand  Site prep: alcohol  Technique: anatomical landmarks  Attempts: 3

## 2022-02-14 NOTE — ANESTHESIA PROCEDURE NOTES
Arterial Line  Performed by: Carla Huitron MD  Authorized by: Carla Huitron MD     General Information and Staff    Procedure Start:  2/14/2022 7:19 AM  Procedure End:  2/14/2022 7:23 AM  Anesthesiologist:  Carla Huitron MD  Performed By:  Anesthesiologist  Patient Location:  OR  Indication: continuous blood pressure monitoring and blood sampling needed    Site Identification: real time ultrasound guided    Preanesthetic Checklist: 2 patient identifiers, IV checked, risks and benefits discussed, monitors and equipment checked, pre-op evaluation, timeout performed, anesthesia consent and sterile technique used    Procedure Details    Catheter Size:  20 G  Catheter Length:  1 and 3/4 inchCatheter Type:  Arrow  Seldinger Technique?: Yes    Laterality:  LeftSite:  Radial artery  Site Prep: chlorhexidine  Line Secured:  Wrist Brace, tape and Tegaderm    Assessment    Events: patient tolerated procedure well with no complications      Medications      Additional Comments Biopsy Method: 15 blade

## 2022-02-15 LAB
ANION GAP SERPL CALC-SCNC: 5 MMOL/L (ref 0–18)
BASOPHILS # BLD AUTO: 0.05 X10(3) UL (ref 0–0.2)
BASOPHILS NFR BLD AUTO: 0.5 %
BUN BLD-MCNC: 9 MG/DL (ref 7–18)
CALCIUM BLD-MCNC: 8.3 MG/DL (ref 8.5–10.1)
CHLORIDE SERPL-SCNC: 108 MMOL/L (ref 98–112)
CO2 SERPL-SCNC: 28 MMOL/L (ref 21–32)
CREAT BLD-MCNC: 0.52 MG/DL
EOSINOPHIL # BLD AUTO: 0.12 X10(3) UL (ref 0–0.7)
EOSINOPHIL NFR BLD AUTO: 1.3 %
ERYTHROCYTE [DISTWIDTH] IN BLOOD BY AUTOMATED COUNT: 13.4 %
GLUCOSE BLD-MCNC: 105 MG/DL (ref 70–99)
HCT VFR BLD AUTO: 37.2 %
HGB BLD-MCNC: 11.4 G/DL
IMM GRANULOCYTES # BLD AUTO: 0.02 X10(3) UL (ref 0–1)
IMM GRANULOCYTES NFR BLD: 0.2 %
LYMPHOCYTES # BLD AUTO: 2.08 X10(3) UL (ref 1–4)
LYMPHOCYTES NFR BLD AUTO: 22.8 %
MAGNESIUM SERPL-MCNC: 2.1 MG/DL (ref 1.6–2.6)
MCH RBC QN AUTO: 28.4 PG (ref 26–34)
MCHC RBC AUTO-ENTMCNC: 30.6 G/DL (ref 31–37)
MCV RBC AUTO: 92.8 FL
MONOCYTES # BLD AUTO: 0.67 X10(3) UL (ref 0.1–1)
MONOCYTES NFR BLD AUTO: 7.4 %
NEUTROPHILS # BLD AUTO: 6.17 X10(3) UL (ref 1.5–7.7)
NEUTROPHILS NFR BLD AUTO: 67.8 %
OSMOLALITY SERPL CALC.SUM OF ELEC: 291 MOSM/KG (ref 275–295)
PLATELET # BLD AUTO: 221 10(3)UL (ref 150–450)
POTASSIUM SERPL-SCNC: 3.8 MMOL/L (ref 3.5–5.1)
RBC # BLD AUTO: 4.01 X10(6)UL
SODIUM SERPL-SCNC: 141 MMOL/L (ref 136–145)
WBC # BLD AUTO: 9.1 X10(3) UL (ref 4–11)

## 2022-02-15 PROCEDURE — 99231 SBSQ HOSP IP/OBS SF/LOW 25: CPT | Performed by: HOSPITALIST

## 2022-02-15 RX ORDER — PANTOPRAZOLE SODIUM 40 MG/1
40 TABLET, DELAYED RELEASE ORAL
Status: DISCONTINUED | OUTPATIENT
Start: 2022-02-15 | End: 2022-02-15

## 2022-02-15 RX ORDER — LANSOPRAZOLE 15 MG/1
30 CAPSULE, DELAYED RELEASE ORAL DAILY
Status: DISCONTINUED | OUTPATIENT
Start: 2022-02-15 | End: 2022-02-16

## 2022-02-15 RX ORDER — LANSOPRAZOLE 30 MG/1
30 TABLET, ORALLY DISINTEGRATING, DELAYED RELEASE ORAL DAILY
Status: DISCONTINUED | OUTPATIENT
Start: 2022-02-15 | End: 2022-02-15 | Stop reason: SDUPTHER

## 2022-02-15 NOTE — PROGRESS NOTES
02/15/22 1410   Over the last 2 weeks, how often have you been bothered by any of the following problems? Little interest or pleasure in doing things 0   Feeling down, depressed, or hopeless 2   Trouble falling or staying asleep, or sleeping too much 0   Feeling tired or having little energy 1   Poor appetite or overeating 0   Feeling bad about yourself - or that you are a failure or have let yourself or your family down 0   Trouble concentrating on things, such as reading the newspaper or watching television 2   Moving or speaking so slowly that other people could have noticed. Or the opposite - being so fidgety or restless that you have been moving around a lot more than usual 0   Thoughts that you would be better off dead, or of hurting yourself in some way 0   PHQ-9 TOTAL SCORE 5   If you checked off any problems, how difficult have these problems made it for you to do your work, take care of things at home, or get along with other people? Somewhat difficult   [address] [facility] Resource Referral Counselor Note    Sukhi Harper Patient Status:  Inpatient    10/8/1950 MRN [id number]   [facility] 2NE-A Attending Radha Mack., Deepali English MD   Hosp Day # 1 PCP Cally CHAPIN(subjective) The patient admits to having some anxiety and depression over the past couple of months. She reports this is due to her aunt and uncle passing away and worrying about the surgery/possible cancer for her self. Patient said, she feels better and more relaxed now that the surgery is over and her family is buried. She denies any suicidal thoughts. O(objective) The patient is alert and oriented x4. Her mood and affect is wnl. A(assessment) The phq9 was completed. P(plan) The patient refused any mental health referrals. She said, she is doing better now.       Early Claude, RN  2/15/2022  2:11 PM

## 2022-02-15 NOTE — PLAN OF CARE
Patient alert and oriented; vital signs stable; cardiac monitor showing SR; lung sounds clear on the left, the right has crackles and diminished, patient weaned to room air, clear/thick secretions with cough, able to do 1000 ml on the incentive spirometer; chest tube to right, at change of shift, there was a total of 500 ml of output, chest tube is now to waterseal, dressing is clean/dry/intact; pain managed with oxycodone 10 as ordered; patient continent of bowel and bladder with last bowel movement 2/13; patient ambulates with a standby assist because of the chest tube, gait is steady.      1330: Patient in bed trying to take a nap, oxygen saturation dropped to 80%, placed on 2 liters of oxygen via nasal cannula and now sating at 98%    Problem: Patient/Family Goals  Goal: Patient/Family Long Term Goal  Description: Patient's Long Term Goal: \"stay out of hospital\"    Interventions:  - follow ups, medication, diet and activity as ordered  - See additional Care Plan goals for specific interventions  Outcome: Progressing  Goal: Patient/Family Short Term Goal  Description: Patient's Short Term Goal: \"managed pain\"    Interventions:   - pain assessment & reassessment per protocol  - scheduled IV Ofirmev & IV Toradol  - PRN pain medication  - repositioning and non-pharm interventions  - See additional Care Plan goals for specific interventions  Outcome: Progressing     Problem: PAIN - ADULT  Goal: Verbalizes/displays adequate comfort level or patient's stated pain goal  Description: INTERVENTIONS:  - Encourage pt to monitor pain and request assistance  - Assess pain using appropriate pain scale  - Administer analgesics based on type and severity of pain and evaluate response  - Implement non-pharmacological measures as appropriate and evaluate response  - Consider cultural and social influences on pain and pain management  - Manage/alleviate anxiety  - Utilize distraction and/or relaxation techniques  - Monitor for opioid side effects  - Notify MD/LIP if interventions unsuccessful or patient reports new pain  - Anticipate increased pain with activity and pre-medicate as appropriate  Outcome: Progressing     Problem: RISK FOR INFECTION - ADULT  Goal: Absence of fever/infection during anticipated neutropenic period  Description: INTERVENTIONS  - Monitor WBC  - Administer growth factors as ordered  - Implement neutropenic guidelines  Outcome: Progressing     Problem: SAFETY ADULT - FALL  Goal: Free from fall injury  Description: INTERVENTIONS:  - Assess pt frequently for physical needs  - Identify cognitive and physical deficits and behaviors that affect risk of falls.   - [place] fall precautions as indicated by assessment.  - Educate pt/family on patient safety including physical limitations  - Instruct pt to call for assistance with activity based on assessment  - Modify environment to reduce risk of injury  - Provide assistive devices as appropriate  - Consider OT/PT consult to assist with strengthening/mobility  - Encourage toileting schedule  Outcome: Progressing     Problem: DISCHARGE PLANNING  Goal: Discharge to home or other facility with appropriate resources  Description: INTERVENTIONS:  - Identify barriers to discharge w/pt and caregiver  - Include patient/family/discharge partner in discharge planning  - Arrange for needed discharge resources and transportation as appropriate  - Identify discharge learning needs (meds, wound care, etc)  - Arrange for interpreters to assist at discharge as needed  - Consider post-discharge preferences of patient/family/discharge partner  - Complete POLST form as appropriate  - Assess patient's ability to be responsible for managing their own health  - Refer to Case Management Department for coordinating discharge planning if the patient needs post-hospital services based on physician/LIP order or complex needs related to functional status, cognitive ability or social support system  Outcome: Progressing     Problem: RESPIRATORY - ADULT  Goal: Achieves optimal ventilation and oxygenation  Description: INTERVENTIONS:  - Assess for changes in respiratory status  - Assess for changes in mentation and behavior  - Position to facilitate oxygenation and minimize respiratory effort  - Oxygen supplementation based on oxygen saturation or ABGs  - Provide Smoking Cessation handout, if applicable  - Encourage broncho-pulmonary hygiene including cough, deep breathe, Incentive Spirometry  - Assess the need for suctioning and perform as needed  - Assess and instruct to report SOB or any respiratory difficulty  - Respiratory Therapy support as indicated  - Manage/alleviate anxiety  - Monitor for signs/symptoms of CO2 retention  Outcome: Progressing

## 2022-02-15 NOTE — PLAN OF CARE
Assumed care for pt at 19:30 on 2/14    A&Ox4. Pain managed with one dose 0.4 dilaudid and 10mg oxycodone prn along with scheduled IV tylenol/toradol. VSS on 1L, NSR on tele. 1000 on IS best effort. Bilateral SCDs on, heparin sub q for VTE prophylaxis  Ancef infused. R CT to wall suction, no air leak or crepitus. Up to [facility] with 1 assist, NS @ 60/hr    Plan: Daily weight, I&O, CT to wall suction with extension tubing. CBC, BMP, Mag AM draw    Bed alarm on, call light in reach, able to make needs known.

## 2022-02-16 VITALS
RESPIRATION RATE: 16 BRPM | BODY MASS INDEX: 35.85 KG/M2 | HEIGHT: 64 IN | DIASTOLIC BLOOD PRESSURE: 75 MMHG | TEMPERATURE: 98 F | WEIGHT: 210 LBS | OXYGEN SATURATION: 99 % | HEART RATE: 82 BPM | SYSTOLIC BLOOD PRESSURE: 118 MMHG

## 2022-02-16 PROCEDURE — 99232 SBSQ HOSP IP/OBS MODERATE 35: CPT | Performed by: INTERNAL MEDICINE

## 2022-02-16 RX ORDER — OXYCODONE HYDROCHLORIDE 5 MG/1
5 TABLET ORAL EVERY 4 HOURS PRN
Qty: 40 TABLET | Refills: 0 | Status: SHIPPED | OUTPATIENT
Start: 2022-02-16

## 2022-02-16 NOTE — CM/SW NOTE
Received order for overnight O2. Order and supporting documentation sent to Ismael Reed in [address] [place] [address]. Await acceptance. Pt resides in [facility] (near [place]). RAFAEL Rollins, [facility]   / Discharge Planner  [id number]    Addendum 10:31am: [address] [facility] [address] can accept pt for overnight O2. Pepe from [address] [place] [address] delivering stationary concentrator at bedside shortly. AVS updated. [employer]  [address] [facility], [address] [place] [address]  Phone: [phone]  Fax: [phone]

## 2022-02-16 NOTE — PLAN OF CARE
Assumed care of pt at [address] [place] [address]. A/ox4, rm air during day, PRN 1-2L NC at nighttime; overnight oxygen study completed tonight. Diminished crackles auscultated in lungs bilaterally. Pt reports productive cough w/ white, thick sputum. Non-pitting edema present in BLE. Right chest tube site soft upon palpation, no crepitus noted. Pt does report pain at chest tube site; scheduled pain meds administered, and pt received two doses of oxycodone overnight. Chest tube output for shift: 200ml    Discussed POC w/ pt. Will continue to monitor.     Problem: Patient/Family Goals  Goal: Patient/Family Long Term Goal  Description: Patient's Long Term Goal: \"stay out of hospital\"    Interventions:  - follow ups, medication, diet and activity as ordered  - See additional Care Plan goals for specific interventions  Outcome: Progressing  Goal: Patient/Family Short Term Goal  Description: Patient's Short Term Goal: \"managed pain\"    Interventions:   - pain assessment & reassessment per protocol  - scheduled IV Ofirmev & IV Toradol  - PRN pain medication  - repositioning and non-pharm interventions  - See additional Care Plan goals for specific interventions  Outcome: Progressing     Problem: PAIN - ADULT  Goal: Verbalizes/displays adequate comfort level or patient's stated pain goal  Description: INTERVENTIONS:  - Encourage pt to monitor pain and request assistance  - Assess pain using appropriate pain scale  - Administer analgesics based on type and severity of pain and evaluate response  - Implement non-pharmacological measures as appropriate and evaluate response  - Consider cultural and social influences on pain and pain management  - Manage/alleviate anxiety  - Utilize distraction and/or relaxation techniques  - Monitor for opioid side effects  - Notify MD/LIP if interventions unsuccessful or patient reports new pain  - Anticipate increased pain with activity and pre-medicate as appropriate  Outcome: Progressing     Problem: RISK FOR INFECTION - ADULT  Goal: Absence of fever/infection during anticipated neutropenic period  Description: INTERVENTIONS  - Monitor WBC  - Administer growth factors as ordered  - Implement neutropenic guidelines  Outcome: Progressing     Problem: SAFETY ADULT - FALL  Goal: Free from fall injury  Description: INTERVENTIONS:  - Assess pt frequently for physical needs  - Identify cognitive and physical deficits and behaviors that affect risk of falls.   - [place] fall precautions as indicated by assessment.  - Educate pt/family on patient safety including physical limitations  - Instruct pt to call for assistance with activity based on assessment  - Modify environment to reduce risk of injury  - Provide assistive devices as appropriate  - Consider OT/PT consult to assist with strengthening/mobility  - Encourage toileting schedule  Outcome: Progressing     Problem: DISCHARGE PLANNING  Goal: Discharge to home or other facility with appropriate resources  Description: INTERVENTIONS:  - Identify barriers to discharge w/pt and caregiver  - Include patient/family/discharge partner in discharge planning  - Arrange for needed discharge resources and transportation as appropriate  - Identify discharge learning needs (meds, wound care, etc)  - Arrange for interpreters to assist at discharge as needed  - Consider post-discharge preferences of patient/family/discharge partner  - Complete POLST form as appropriate  - Assess patient's ability to be responsible for managing their own health  - Refer to Case Management Department for coordinating discharge planning if the patient needs post-hospital services based on physician/LIP order or complex needs related to functional status, cognitive ability or social support system  Outcome: Progressing     Problem: RESPIRATORY - ADULT  Goal: Achieves optimal ventilation and oxygenation  Description: INTERVENTIONS:  - Assess for changes in respiratory status  - Assess for changes in mentation and behavior  - Position to facilitate oxygenation and minimize respiratory effort  - Oxygen supplementation based on oxygen saturation or ABGs  - Provide Smoking Cessation handout, if applicable  - Encourage broncho-pulmonary hygiene including cough, deep breathe, Incentive Spirometry  - Assess the need for suctioning and perform as needed  - Assess and instruct to report SOB or any respiratory difficulty  - Respiratory Therapy support as indicated  - Manage/alleviate anxiety  - Monitor for signs/symptoms of CO2 retention  Outcome: Progressing

## 2022-02-16 NOTE — PLAN OF CARE
Patient alert and oriented; vital signs stable; cardiac monitor showing SR; lung sounds clear on the left, with crackles and diminished on the right, on room air while awake, cough with thick white secretions; no edema; continent of bowel and bladder with last bowel movement 2/13; chest tube was removed by ANUJ Rob with surgery this morning, suture in place, no complications; per Laureen Calabrese, patient can be discharged if all physicians agree; oxygen for sleep delivered; per Dr Gurpreet Chung, okay with discharge; patient happy to be going home today.      Problem: Patient/Family Goals  Goal: Patient/Family Long Term Goal  Description: Patient's Long Term Goal: \"stay out of hospital\"    Interventions:  - follow ups, medication, diet and activity as ordered  - See additional Care Plan goals for specific interventions  Outcome: Progressing  Goal: Patient/Family Short Term Goal  Description: Patient's Short Term Goal: \"managed pain\"    Interventions:   - pain assessment & reassessment per protocol  - scheduled IV Ofirmev & IV Toradol  - PRN pain medication  - repositioning and non-pharm interventions  - See additional Care Plan goals for specific interventions  Outcome: Progressing     Problem: PAIN - ADULT  Goal: Verbalizes/displays adequate comfort level or patient's stated pain goal  Description: INTERVENTIONS:  - Encourage pt to monitor pain and request assistance  - Assess pain using appropriate pain scale  - Administer analgesics based on type and severity of pain and evaluate response  - Implement non-pharmacological measures as appropriate and evaluate response  - Consider cultural and social influences on pain and pain management  - Manage/alleviate anxiety  - Utilize distraction and/or relaxation techniques  - Monitor for opioid side effects  - Notify MD/LIP if interventions unsuccessful or patient reports new pain  - Anticipate increased pain with activity and pre-medicate as appropriate  Outcome: Progressing Problem: RISK FOR INFECTION - ADULT  Goal: Absence of fever/infection during anticipated neutropenic period  Description: INTERVENTIONS  - Monitor WBC  - Administer growth factors as ordered  - Implement neutropenic guidelines  Outcome: Progressing     Problem: SAFETY ADULT - FALL  Goal: Free from fall injury  Description: INTERVENTIONS:  - Assess pt frequently for physical needs  - Identify cognitive and physical deficits and behaviors that affect risk of falls.   - [place] fall precautions as indicated by assessment.  - Educate pt/family on patient safety including physical limitations  - Instruct pt to call for assistance with activity based on assessment  - Modify environment to reduce risk of injury  - Provide assistive devices as appropriate  - Consider OT/PT consult to assist with strengthening/mobility  - Encourage toileting schedule  Outcome: Progressing     Problem: DISCHARGE PLANNING  Goal: Discharge to home or other facility with appropriate resources  Description: INTERVENTIONS:  - Identify barriers to discharge w/pt and caregiver  - Include patient/family/discharge partner in discharge planning  - Arrange for needed discharge resources and transportation as appropriate  - Identify discharge learning needs (meds, wound care, etc)  - Arrange for interpreters to assist at discharge as needed  - Consider post-discharge preferences of patient/family/discharge partner  - Complete POLST form as appropriate  - Assess patient's ability to be responsible for managing their own health  - Refer to Case Management Department for coordinating discharge planning if the patient needs post-hospital services based on physician/LIP order or complex needs related to functional status, cognitive ability or social support system  Outcome: Progressing     Problem: RESPIRATORY - ADULT  Goal: Achieves optimal ventilation and oxygenation  Description: INTERVENTIONS:  - Assess for changes in respiratory status  - Assess for changes in mentation and behavior  - Position to facilitate oxygenation and minimize respiratory effort  - Oxygen supplementation based on oxygen saturation or ABGs  - Provide Smoking Cessation handout, if applicable  - Encourage broncho-pulmonary hygiene including cough, deep breathe, Incentive Spirometry  - Assess the need for suctioning and perform as needed  - Assess and instruct to report SOB or any respiratory difficulty  - Respiratory Therapy support as indicated  - Manage/alleviate anxiety  - Monitor for signs/symptoms of CO2 retention  Outcome: Progressing

## 2022-02-16 NOTE — PLAN OF CARE
D: Patient received orders for discharge    A: Oxygen delivered by Siddharth Hdz for sleep; reviewed post VATS discharge instructions; reviewed discharge instructions, including appointments made and to be made (including sleep study); reviewed discharge medications, patient to  medication at her pharmacy, patient's own lansoprazole returned to patient; PIV removed; cardiac monitor removed; belongings removed from room. R: Patient left via wheelchair with transport to patient's 's car without incident.

## 2022-02-16 NOTE — PROGRESS NOTES
02/16/22 0234 02/16/22 0235 02/16/22 0236   Oxygen Therapy   SpO2 (!) 86 % (!) 86 % (!) 85 %   O2 Device None (Room air) None (Room air) None (Room air)   O2 Flow Rate (L/min)  --   --   --    Pulse Oximetry Type Continuous Continuous Continuous      02/16/22 0237 02/16/22 0238 02/16/22 0249   Oxygen Therapy   SpO2 (!) 86 % (!) 86 % 97 %   O2 Device None (Room air) None (Room air) Nasal cannula   O2 Flow Rate (L/min)  --   --  2 L/min   Pulse Oximetry Type Continuous Continuous Continuous     Overnight oxygen study

## 2022-02-17 LAB — BLOOD TYPE BARCODE: 600

## 2022-02-21 ENCOUNTER — TELEPHONE (OUTPATIENT)
Dept: HEMATOLOGY/ONCOLOGY | Facility: HOSPITAL | Age: 72
End: 2022-02-21

## 2022-02-21 NOTE — TELEPHONE ENCOUNTER
Rodney Contreras did speak with Paige Lanza from Dr Rowland Current office. She told her that Dr Cj Caballero was updated. He is not concerned at this time. If she develops a fever or worsening symptoms to call the office. How Severe Is Your Skin Lesion?: mild Has Your Skin Lesion Been Treated?: not been treated Is This A New Presentation, Or A Follow-Up?: Skin Lesion

## 2022-02-21 NOTE — TELEPHONE ENCOUNTER
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 Last Thursday she started having cold symptoms of a runny nose, headache, head congestion, post nasal drip, productive cough (clear mucus) and low grade fever (99.6). Her daughter is a nurse and she listened to her lungs yesterday. She told Sarahi Barajas she heard crackles in her left lower lobe. Sarahi Barajas denies dyspnea at rest or with exertion. Her pulse ox is 94-95% on room air. She does have home oxygen for use at night. Denies any sick or Covid 19+ contacts.)    Sarahi Barajas spoke with Dr Vito desai this morning at 8:40 am. She was told they would call her back shortly. She has not received a call back. She and her daughter are concerned she may have pneumonia. She [place] 1[place]5 hours away. She wants to know what she should do? Should she go to the local ER? She thought she should call Dr Bello Rasheed office as well. I told Perry Rosemarie that I will call Dr Vito desai now and call her back. I spoke with Dr Vito desai. They said they will have the nurse call her back shortly. I updated Sarahi Barajas. I told her if she does not hear from Dr Vito desai to please call me back. She may have to present to the local ER for evaluation.

## 2022-03-01 ENCOUNTER — TELEPHONE (OUTPATIENT)
Dept: HEMATOLOGY/ONCOLOGY | Facility: HOSPITAL | Age: 72
End: 2022-03-01

## 2022-03-01 NOTE — TELEPHONE ENCOUNTER
Patient called she is post op and still has stitches which have gotten red with some drainage, she has been applying antibiotic ointment to area, is due in tomorrow for stitch removal, no fevers. Instructed if any fevers she needed to present to ER for evaluation. She verbalizes understanding.

## 2022-03-02 ENCOUNTER — OFFICE VISIT (OUTPATIENT)
Dept: HEMATOLOGY/ONCOLOGY | Facility: HOSPITAL | Age: 72
End: 2022-03-02
Attending: STUDENT IN AN ORGANIZED HEALTH CARE EDUCATION/TRAINING PROGRAM
Payer: MEDICARE

## 2022-03-02 VITALS
OXYGEN SATURATION: 95 % | BODY MASS INDEX: 36 KG/M2 | TEMPERATURE: 98 F | WEIGHT: 210.81 LBS | DIASTOLIC BLOOD PRESSURE: 77 MMHG | SYSTOLIC BLOOD PRESSURE: 120 MMHG | RESPIRATION RATE: 18 BRPM | HEART RATE: 75 BPM

## 2022-03-02 DIAGNOSIS — C34.91 ADENOCARCINOMA OF RIGHT LUNG (HCC): ICD-10-CM

## 2022-03-02 DIAGNOSIS — D09.9 ADENOCARCINOMA IN SITU: ICD-10-CM

## 2022-03-02 DIAGNOSIS — R91.8 MULTIPLE LUNG NODULES ON CT: Primary | ICD-10-CM

## 2022-03-02 DIAGNOSIS — C34.31 PRIMARY ADENOCARCINOMA OF LOWER LOBE OF RIGHT LUNG (HCC): Primary | ICD-10-CM

## 2022-03-02 PROCEDURE — 99215 OFFICE O/P EST HI 40 MIN: CPT | Performed by: INTERNAL MEDICINE

## 2022-03-02 PROCEDURE — 99211 OFF/OP EST MAY X REQ PHY/QHP: CPT

## 2022-06-02 ENCOUNTER — OFFICE VISIT (OUTPATIENT)
Dept: HEMATOLOGY/ONCOLOGY | Facility: HOSPITAL | Age: 72
End: 2022-06-02
Attending: STUDENT IN AN ORGANIZED HEALTH CARE EDUCATION/TRAINING PROGRAM
Payer: MEDICARE

## 2022-06-02 ENCOUNTER — HOSPITAL ENCOUNTER (OUTPATIENT)
Dept: CT IMAGING | Facility: HOSPITAL | Age: 72
Discharge: HOME | End: 2022-06-02
Attending: STUDENT IN AN ORGANIZED HEALTH CARE EDUCATION/TRAINING PROGRAM
Payer: MEDICARE

## 2022-06-02 ENCOUNTER — LAB ENCOUNTER (OUTPATIENT)
Dept: LAB | Facility: HOSPITAL | Age: 72
End: 2022-06-02
Attending: STUDENT IN AN ORGANIZED HEALTH CARE EDUCATION/TRAINING PROGRAM
Payer: MEDICARE

## 2022-06-02 VITALS
OXYGEN SATURATION: 98 % | SYSTOLIC BLOOD PRESSURE: 115 MMHG | WEIGHT: 207 LBS | BODY MASS INDEX: 35.34 KG/M2 | HEART RATE: 74 BPM | RESPIRATION RATE: 16 BRPM | TEMPERATURE: 97 F | DIASTOLIC BLOOD PRESSURE: 76 MMHG | HEIGHT: 64.06 IN

## 2022-06-02 DIAGNOSIS — C34.91 ADENOCARCINOMA OF RIGHT LUNG (HCC): Primary | ICD-10-CM

## 2022-06-02 DIAGNOSIS — D09.9 ADENOCARCINOMA IN SITU: ICD-10-CM

## 2022-06-02 DIAGNOSIS — C34.91 ADENOCARCINOMA OF RIGHT LUNG (HCC): ICD-10-CM

## 2022-06-02 DIAGNOSIS — C34.91 BRONCHOGENIC CANCER OF RIGHT LUNG (HCC): ICD-10-CM

## 2022-06-02 DIAGNOSIS — C34.31 PRIMARY ADENOCARCINOMA OF LOWER LOBE OF RIGHT LUNG (HCC): ICD-10-CM

## 2022-06-02 LAB
ALBUMIN SERPL-MCNC: 3.3 G/DL (ref 3.4–5)
ALBUMIN/GLOB SERPL: 1.1 {RATIO} (ref 1–2)
ALP LIVER SERPL-CCNC: 46 U/L
ALT SERPL-CCNC: 22 U/L
ANION GAP SERPL CALC-SCNC: 6 MMOL/L (ref 0–18)
AST SERPL-CCNC: 15 U/L (ref 15–37)
BASOPHILS # BLD AUTO: 0.04 X10(3) UL (ref 0–0.2)
BASOPHILS NFR BLD AUTO: 0.4 %
BILIRUB SERPL-MCNC: 0.4 MG/DL (ref 0.1–2)
BUN BLD-MCNC: 15 MG/DL (ref 7–18)
CALCIUM BLD-MCNC: 8.8 MG/DL (ref 8.5–10.1)
CHLORIDE SERPL-SCNC: 109 MMOL/L (ref 98–112)
CO2 SERPL-SCNC: 26 MMOL/L (ref 21–32)
CREAT BLD-MCNC: 0.68 MG/DL
EOSINOPHIL # BLD AUTO: 0.16 X10(3) UL (ref 0–0.7)
EOSINOPHIL NFR BLD AUTO: 1.6 %
ERYTHROCYTE [DISTWIDTH] IN BLOOD BY AUTOMATED COUNT: 13.9 %
FASTING STATUS PATIENT QL REPORTED: NO
GLOBULIN PLAS-MCNC: 3 G/DL (ref 2.8–4.4)
GLUCOSE BLD-MCNC: 103 MG/DL (ref 70–99)
HCT VFR BLD AUTO: 40.8 %
HGB BLD-MCNC: 13 G/DL
IMM GRANULOCYTES # BLD AUTO: 0.02 X10(3) UL (ref 0–1)
IMM GRANULOCYTES NFR BLD: 0.2 %
LDH SERPL L TO P-CCNC: 160 U/L
LYMPHOCYTES # BLD AUTO: 1.85 X10(3) UL (ref 1–4)
LYMPHOCYTES NFR BLD AUTO: 19 %
MCH RBC QN AUTO: 28.7 PG (ref 26–34)
MCHC RBC AUTO-ENTMCNC: 31.9 G/DL (ref 31–37)
MCV RBC AUTO: 90.1 FL
MONOCYTES # BLD AUTO: 0.77 X10(3) UL (ref 0.1–1)
MONOCYTES NFR BLD AUTO: 7.9 %
NEUTROPHILS # BLD AUTO: 6.89 X10 (3) UL (ref 1.5–7.7)
NEUTROPHILS # BLD AUTO: 6.89 X10(3) UL (ref 1.5–7.7)
NEUTROPHILS NFR BLD AUTO: 70.9 %
OSMOLALITY SERPL CALC.SUM OF ELEC: 293 MOSM/KG (ref 275–295)
PLATELET # BLD AUTO: 266 10(3)UL (ref 150–450)
POTASSIUM SERPL-SCNC: 4.1 MMOL/L (ref 3.5–5.1)
PROT SERPL-MCNC: 6.3 G/DL (ref 6.4–8.2)
RBC # BLD AUTO: 4.53 X10(6)UL
SODIUM SERPL-SCNC: 141 MMOL/L (ref 136–145)
WBC # BLD AUTO: 9.7 X10(3) UL (ref 4–11)

## 2022-06-02 PROCEDURE — 83615 LACTATE (LD) (LDH) ENZYME: CPT

## 2022-06-02 PROCEDURE — 80053 COMPREHEN METABOLIC PANEL: CPT

## 2022-06-02 PROCEDURE — 85025 COMPLETE CBC W/AUTO DIFF WBC: CPT

## 2022-06-02 PROCEDURE — 99214 OFFICE O/P EST MOD 30 MIN: CPT | Performed by: INTERNAL MEDICINE

## 2022-06-02 PROCEDURE — 71250 CT THORAX DX C-: CPT | Performed by: STUDENT IN AN ORGANIZED HEALTH CARE EDUCATION/TRAINING PROGRAM

## 2022-06-02 PROCEDURE — 36415 COLL VENOUS BLD VENIPUNCTURE: CPT

## 2022-12-01 ENCOUNTER — LAB ENCOUNTER (OUTPATIENT)
Dept: LAB | Facility: HOSPITAL | Age: 72
End: 2022-12-01
Attending: INTERNAL MEDICINE
Payer: MEDICARE

## 2022-12-01 ENCOUNTER — OFFICE VISIT (OUTPATIENT)
Dept: HEMATOLOGY/ONCOLOGY | Facility: HOSPITAL | Age: 72
End: 2022-12-01
Attending: INTERNAL MEDICINE
Payer: MEDICARE

## 2022-12-01 ENCOUNTER — HOSPITAL ENCOUNTER (OUTPATIENT)
Dept: CT IMAGING | Facility: HOSPITAL | Age: 72
Discharge: HOME | End: 2022-12-01
Attending: INTERNAL MEDICINE
Payer: MEDICARE

## 2022-12-01 VITALS
OXYGEN SATURATION: 99 % | HEART RATE: 64 BPM | DIASTOLIC BLOOD PRESSURE: 74 MMHG | SYSTOLIC BLOOD PRESSURE: 110 MMHG | RESPIRATION RATE: 18 BRPM | TEMPERATURE: 98 F | WEIGHT: 208 LBS | HEIGHT: 64.06 IN | BODY MASS INDEX: 35.51 KG/M2

## 2022-12-01 DIAGNOSIS — C34.91 ADENOCARCINOMA OF RIGHT LUNG (HCC): ICD-10-CM

## 2022-12-01 DIAGNOSIS — C34.91 BRONCHOGENIC CANCER OF RIGHT LUNG (HCC): ICD-10-CM

## 2022-12-01 DIAGNOSIS — D09.9 ADENOCARCINOMA IN SITU: ICD-10-CM

## 2022-12-01 DIAGNOSIS — C34.91 BRONCHOGENIC CANCER OF RIGHT LUNG (HCC): Primary | ICD-10-CM

## 2022-12-01 DIAGNOSIS — R91.8 MULTIPLE LUNG NODULES ON CT: ICD-10-CM

## 2022-12-01 DIAGNOSIS — R91.8 GROUND GLASS OPACITY PRESENT ON IMAGING OF LUNG: ICD-10-CM

## 2022-12-01 LAB
ALBUMIN SERPL-MCNC: 3.3 G/DL (ref 3.4–5)
ALBUMIN/GLOB SERPL: 1.1 {RATIO} (ref 1–2)
ALP LIVER SERPL-CCNC: 42 U/L
ALT SERPL-CCNC: 26 U/L
ANION GAP SERPL CALC-SCNC: 1 MMOL/L (ref 0–18)
AST SERPL-CCNC: 11 U/L (ref 15–37)
BASOPHILS # BLD AUTO: 0.07 X10(3) UL (ref 0–0.2)
BASOPHILS NFR BLD AUTO: 0.9 %
BILIRUB SERPL-MCNC: 0.3 MG/DL (ref 0.1–2)
BUN BLD-MCNC: 16 MG/DL (ref 7–18)
CALCIUM BLD-MCNC: 8.9 MG/DL (ref 8.5–10.1)
CHLORIDE SERPL-SCNC: 111 MMOL/L (ref 98–112)
CO2 SERPL-SCNC: 29 MMOL/L (ref 21–32)
CREAT BLD-MCNC: 0.56 MG/DL
CREAT BLD-MCNC: 0.6 MG/DL
EOSINOPHIL # BLD AUTO: 0.12 X10(3) UL (ref 0–0.7)
EOSINOPHIL NFR BLD AUTO: 1.6 %
ERYTHROCYTE [DISTWIDTH] IN BLOOD BY AUTOMATED COUNT: 13.3 %
FASTING STATUS PATIENT QL REPORTED: YES
GFR SERPLBLD BASED ON 1.73 SQ M-ARVRAT: 95 ML/MIN/1.73M2 (ref 60–?)
GFR SERPLBLD BASED ON 1.73 SQ M-ARVRAT: 96 ML/MIN/1.73M2 (ref 60–?)
GLOBULIN PLAS-MCNC: 3 G/DL (ref 2.8–4.4)
GLUCOSE BLD-MCNC: 96 MG/DL (ref 70–99)
HCT VFR BLD AUTO: 42.2 %
HGB BLD-MCNC: 13.4 G/DL
IMM GRANULOCYTES # BLD AUTO: 0.03 X10(3) UL (ref 0–1)
IMM GRANULOCYTES NFR BLD: 0.4 %
LDH SERPL L TO P-CCNC: 160 U/L
LYMPHOCYTES # BLD AUTO: 2.57 X10(3) UL (ref 1–4)
LYMPHOCYTES NFR BLD AUTO: 34.1 %
MCH RBC QN AUTO: 29.5 PG (ref 26–34)
MCHC RBC AUTO-ENTMCNC: 31.8 G/DL (ref 31–37)
MCV RBC AUTO: 92.7 FL
MONOCYTES # BLD AUTO: 0.48 X10(3) UL (ref 0.1–1)
MONOCYTES NFR BLD AUTO: 6.4 %
NEUTROPHILS # BLD AUTO: 4.27 X10 (3) UL (ref 1.5–7.7)
NEUTROPHILS # BLD AUTO: 4.27 X10(3) UL (ref 1.5–7.7)
NEUTROPHILS NFR BLD AUTO: 56.6 %
OSMOLALITY SERPL CALC.SUM OF ELEC: 293 MOSM/KG (ref 275–295)
PLATELET # BLD AUTO: 243 10(3)UL (ref 150–450)
POTASSIUM SERPL-SCNC: 4.3 MMOL/L (ref 3.5–5.1)
PROT SERPL-MCNC: 6.3 G/DL (ref 6.4–8.2)
RBC # BLD AUTO: 4.55 X10(6)UL
SODIUM SERPL-SCNC: 141 MMOL/L (ref 136–145)
WBC # BLD AUTO: 7.5 X10(3) UL (ref 4–11)

## 2022-12-01 PROCEDURE — 80053 COMPREHEN METABOLIC PANEL: CPT

## 2022-12-01 PROCEDURE — 82565 ASSAY OF CREATININE: CPT

## 2022-12-01 PROCEDURE — 71260 CT THORAX DX C+: CPT | Performed by: INTERNAL MEDICINE

## 2022-12-01 PROCEDURE — 85025 COMPLETE CBC W/AUTO DIFF WBC: CPT

## 2022-12-01 PROCEDURE — 36415 COLL VENOUS BLD VENIPUNCTURE: CPT

## 2022-12-01 PROCEDURE — 99214 OFFICE O/P EST MOD 30 MIN: CPT | Performed by: INTERNAL MEDICINE

## 2022-12-01 PROCEDURE — 83615 LACTATE (LD) (LDH) ENZYME: CPT

## 2022-12-01 RX ORDER — ROSUVASTATIN CALCIUM 10 MG/1
TABLET, COATED ORAL
COMMUNITY
Start: 2022-11-28

## 2022-12-01 RX ORDER — IOHEXOL 350 MG/ML
75 INJECTION, SOLUTION INTRAVENOUS
Status: COMPLETED | OUTPATIENT
Start: 2022-12-01 | End: 2022-12-01

## 2022-12-01 RX ADMIN — IOHEXOL 75 ML: 350 INJECTION, SOLUTION INTRAVENOUS at 10:56:00

## 2022-12-01 NOTE — PROGRESS NOTES
Education Record    Learner:  Patient/ spouse    Disease / Diagnosis:6 month follow up  Lung ca    Barriers / Limitations:  None   Comments:    Method:  Discussion   Comments:    General Topics:  Plan of care reviewed   Comments:    Outcome:  Shows understanding   Comments:  CT scan today. Feeling well. Stressed because  having heart surgery next week, also daughter had injury and surgery.

## 2023-06-01 ENCOUNTER — HOSPITAL ENCOUNTER (OUTPATIENT)
Dept: CT IMAGING | Facility: HOSPITAL | Age: 73
Discharge: HOME | End: 2023-06-01
Attending: INTERNAL MEDICINE
Payer: MEDICARE

## 2023-06-01 ENCOUNTER — OFFICE VISIT (OUTPATIENT)
Dept: HEMATOLOGY/ONCOLOGY | Facility: HOSPITAL | Age: 73
End: 2023-06-01
Attending: INTERNAL MEDICINE
Payer: MEDICARE

## 2023-06-01 ENCOUNTER — LAB ENCOUNTER (OUTPATIENT)
Dept: LAB | Facility: HOSPITAL | Age: 73
End: 2023-06-01
Attending: INTERNAL MEDICINE
Payer: MEDICARE

## 2023-06-01 VITALS
BODY MASS INDEX: 34.45 KG/M2 | TEMPERATURE: 98 F | RESPIRATION RATE: 18 BRPM | OXYGEN SATURATION: 97 % | DIASTOLIC BLOOD PRESSURE: 65 MMHG | HEIGHT: 64.06 IN | HEART RATE: 69 BPM | WEIGHT: 201.81 LBS | SYSTOLIC BLOOD PRESSURE: 106 MMHG

## 2023-06-01 DIAGNOSIS — C34.91 BRONCHOGENIC CANCER OF RIGHT LUNG (HCC): ICD-10-CM

## 2023-06-01 DIAGNOSIS — D09.9 ADENOCARCINOMA IN SITU: ICD-10-CM

## 2023-06-01 DIAGNOSIS — R91.8 GROUND GLASS OPACITY PRESENT ON IMAGING OF LUNG: ICD-10-CM

## 2023-06-01 DIAGNOSIS — R91.8 MULTIPLE LUNG NODULES ON CT: ICD-10-CM

## 2023-06-01 DIAGNOSIS — C34.91 BRONCHOGENIC CANCER OF RIGHT LUNG (HCC): Primary | ICD-10-CM

## 2023-06-01 LAB
ALBUMIN SERPL-MCNC: 3.9 G/DL (ref 3.4–5)
ALBUMIN/GLOB SERPL: 1.3 {RATIO} (ref 1–2)
ALP LIVER SERPL-CCNC: 41 U/L
ALT SERPL-CCNC: 32 U/L
ANION GAP SERPL CALC-SCNC: 3 MMOL/L (ref 0–18)
AST SERPL-CCNC: 19 U/L (ref 15–37)
BASOPHILS # BLD AUTO: 0.09 X10(3) UL (ref 0–0.2)
BASOPHILS NFR BLD AUTO: 1.5 %
BILIRUB SERPL-MCNC: 0.4 MG/DL (ref 0.1–2)
BUN BLD-MCNC: 19 MG/DL (ref 7–18)
CALCIUM BLD-MCNC: 9 MG/DL (ref 8.5–10.1)
CHLORIDE SERPL-SCNC: 111 MMOL/L (ref 98–112)
CO2 SERPL-SCNC: 27 MMOL/L (ref 21–32)
CREAT BLD-MCNC: 0.6 MG/DL
CREAT BLD-MCNC: 0.67 MG/DL
EOSINOPHIL # BLD AUTO: 0.14 X10(3) UL (ref 0–0.7)
EOSINOPHIL NFR BLD AUTO: 2.3 %
ERYTHROCYTE [DISTWIDTH] IN BLOOD BY AUTOMATED COUNT: 13.2 %
FASTING STATUS PATIENT QL REPORTED: YES
GFR SERPLBLD BASED ON 1.73 SQ M-ARVRAT: 92 ML/MIN/1.73M2 (ref 60–?)
GFR SERPLBLD BASED ON 1.73 SQ M-ARVRAT: 95 ML/MIN/1.73M2 (ref 60–?)
GLOBULIN PLAS-MCNC: 2.9 G/DL (ref 2.8–4.4)
GLUCOSE BLD-MCNC: 106 MG/DL (ref 70–99)
HCT VFR BLD AUTO: 42 %
HGB BLD-MCNC: 13.4 G/DL
IMM GRANULOCYTES # BLD AUTO: 0.01 X10(3) UL (ref 0–1)
IMM GRANULOCYTES NFR BLD: 0.2 %
LDH SERPL L TO P-CCNC: 185 U/L
LYMPHOCYTES # BLD AUTO: 2.14 X10(3) UL (ref 1–4)
LYMPHOCYTES NFR BLD AUTO: 35.4 %
MCH RBC QN AUTO: 28.6 PG (ref 26–34)
MCHC RBC AUTO-ENTMCNC: 31.9 G/DL (ref 31–37)
MCV RBC AUTO: 89.6 FL
MONOCYTES # BLD AUTO: 0.41 X10(3) UL (ref 0.1–1)
MONOCYTES NFR BLD AUTO: 6.8 %
NEUTROPHILS # BLD AUTO: 3.25 X10 (3) UL (ref 1.5–7.7)
NEUTROPHILS # BLD AUTO: 3.25 X10(3) UL (ref 1.5–7.7)
NEUTROPHILS NFR BLD AUTO: 53.8 %
OSMOLALITY SERPL CALC.SUM OF ELEC: 295 MOSM/KG (ref 275–295)
PLATELET # BLD AUTO: 241 10(3)UL (ref 150–450)
POTASSIUM SERPL-SCNC: 4.6 MMOL/L (ref 3.5–5.1)
PROT SERPL-MCNC: 6.8 G/DL (ref 6.4–8.2)
RBC # BLD AUTO: 4.69 X10(6)UL
SODIUM SERPL-SCNC: 141 MMOL/L (ref 136–145)
WBC # BLD AUTO: 6 X10(3) UL (ref 4–11)

## 2023-06-01 PROCEDURE — 80053 COMPREHEN METABOLIC PANEL: CPT | Performed by: INTERNAL MEDICINE

## 2023-06-01 PROCEDURE — 99214 OFFICE O/P EST MOD 30 MIN: CPT | Performed by: INTERNAL MEDICINE

## 2023-06-01 PROCEDURE — 85025 COMPLETE CBC W/AUTO DIFF WBC: CPT | Performed by: INTERNAL MEDICINE

## 2023-06-01 PROCEDURE — 83615 LACTATE (LD) (LDH) ENZYME: CPT | Performed by: INTERNAL MEDICINE

## 2023-06-01 PROCEDURE — 71260 CT THORAX DX C+: CPT | Performed by: INTERNAL MEDICINE

## 2023-06-01 PROCEDURE — 82565 ASSAY OF CREATININE: CPT

## 2023-06-01 RX ORDER — ROSUVASTATIN CALCIUM 20 MG/1
TABLET, COATED ORAL
COMMUNITY
Start: 2023-04-13

## 2023-11-30 ENCOUNTER — LAB ENCOUNTER (OUTPATIENT)
Dept: LAB | Age: 73
End: 2023-11-30
Attending: INTERNAL MEDICINE
Payer: MEDICARE

## 2023-11-30 ENCOUNTER — OFFICE VISIT (OUTPATIENT)
Dept: HEMATOLOGY/ONCOLOGY | Facility: HOSPITAL | Age: 73
End: 2023-11-30
Attending: INTERNAL MEDICINE
Payer: MEDICARE

## 2023-11-30 ENCOUNTER — HOSPITAL ENCOUNTER (OUTPATIENT)
Dept: CT IMAGING | Age: 73
Discharge: HOME | End: 2023-11-30
Attending: INTERNAL MEDICINE
Payer: MEDICARE

## 2023-11-30 VITALS
RESPIRATION RATE: 18 BRPM | BODY MASS INDEX: 33.84 KG/M2 | DIASTOLIC BLOOD PRESSURE: 75 MMHG | OXYGEN SATURATION: 99 % | SYSTOLIC BLOOD PRESSURE: 122 MMHG | TEMPERATURE: 97 F | HEART RATE: 73 BPM | HEIGHT: 64.06 IN | WEIGHT: 198.19 LBS

## 2023-11-30 DIAGNOSIS — D09.9 ADENOCARCINOMA IN SITU: ICD-10-CM

## 2023-11-30 DIAGNOSIS — C34.91 BRONCHOGENIC CANCER OF RIGHT LUNG (HCC): ICD-10-CM

## 2023-11-30 DIAGNOSIS — R91.8 MULTIPLE LUNG NODULES ON CT: ICD-10-CM

## 2023-11-30 DIAGNOSIS — C34.91 BRONCHOGENIC CANCER OF RIGHT LUNG (HCC): Primary | ICD-10-CM

## 2023-11-30 DIAGNOSIS — R91.8 GROUND GLASS OPACITY PRESENT ON IMAGING OF LUNG: ICD-10-CM

## 2023-11-30 LAB
ALBUMIN SERPL-MCNC: 3.5 G/DL (ref 3.4–5)
ALBUMIN/GLOB SERPL: 1.2 {RATIO} (ref 1–2)
ALP LIVER SERPL-CCNC: 41 U/L
ALT SERPL-CCNC: 28 U/L
ANION GAP SERPL CALC-SCNC: 1 MMOL/L (ref 0–18)
AST SERPL-CCNC: 15 U/L (ref 15–37)
BASOPHILS # BLD AUTO: 0.08 X10(3) UL (ref 0–0.2)
BASOPHILS NFR BLD AUTO: 1 %
BILIRUB SERPL-MCNC: 0.5 MG/DL (ref 0.1–2)
BUN BLD-MCNC: 15 MG/DL (ref 9–23)
CALCIUM BLD-MCNC: 9.3 MG/DL (ref 8.5–10.1)
CHLORIDE SERPL-SCNC: 108 MMOL/L (ref 98–112)
CO2 SERPL-SCNC: 32 MMOL/L (ref 21–32)
CREAT BLD-MCNC: 0.7 MG/DL
CREAT BLD-MCNC: 0.7 MG/DL
EGFRCR SERPLBLD CKD-EPI 2021: 91 ML/MIN/1.73M2 (ref 60–?)
EGFRCR SERPLBLD CKD-EPI 2021: 91 ML/MIN/1.73M2 (ref 60–?)
EOSINOPHIL # BLD AUTO: 0.15 X10(3) UL (ref 0–0.7)
EOSINOPHIL NFR BLD AUTO: 1.9 %
ERYTHROCYTE [DISTWIDTH] IN BLOOD BY AUTOMATED COUNT: 13.5 %
FASTING STATUS PATIENT QL REPORTED: YES
GLOBULIN PLAS-MCNC: 2.9 G/DL (ref 2.8–4.4)
GLUCOSE BLD-MCNC: 98 MG/DL (ref 70–99)
HCT VFR BLD AUTO: 42.5 %
HGB BLD-MCNC: 13.3 G/DL
IMM GRANULOCYTES # BLD AUTO: 0.02 X10(3) UL (ref 0–1)
IMM GRANULOCYTES NFR BLD: 0.3 %
LDH SERPL L TO P-CCNC: 182 U/L
LYMPHOCYTES # BLD AUTO: 2.63 X10(3) UL (ref 1–4)
LYMPHOCYTES NFR BLD AUTO: 33.8 %
MCH RBC QN AUTO: 29.1 PG (ref 26–34)
MCHC RBC AUTO-ENTMCNC: 31.3 G/DL (ref 31–37)
MCV RBC AUTO: 93 FL
MONOCYTES # BLD AUTO: 0.47 X10(3) UL (ref 0.1–1)
MONOCYTES NFR BLD AUTO: 6 %
NEUTROPHILS # BLD AUTO: 4.42 X10 (3) UL (ref 1.5–7.7)
NEUTROPHILS # BLD AUTO: 4.42 X10(3) UL (ref 1.5–7.7)
NEUTROPHILS NFR BLD AUTO: 57 %
OSMOLALITY SERPL CALC.SUM OF ELEC: 293 MOSM/KG (ref 275–295)
PLATELET # BLD AUTO: 276 10(3)UL (ref 150–450)
POTASSIUM SERPL-SCNC: 4.3 MMOL/L (ref 3.5–5.1)
PROT SERPL-MCNC: 6.4 G/DL (ref 6.4–8.2)
RBC # BLD AUTO: 4.57 X10(6)UL
SODIUM SERPL-SCNC: 141 MMOL/L (ref 136–145)
WBC # BLD AUTO: 7.8 X10(3) UL (ref 4–11)

## 2023-11-30 PROCEDURE — 82565 ASSAY OF CREATININE: CPT

## 2023-11-30 PROCEDURE — 36415 COLL VENOUS BLD VENIPUNCTURE: CPT

## 2023-11-30 PROCEDURE — 71260 CT THORAX DX C+: CPT | Performed by: INTERNAL MEDICINE

## 2023-11-30 PROCEDURE — 83615 LACTATE (LD) (LDH) ENZYME: CPT

## 2023-11-30 PROCEDURE — 99214 OFFICE O/P EST MOD 30 MIN: CPT | Performed by: INTERNAL MEDICINE

## 2023-11-30 PROCEDURE — 85025 COMPLETE CBC W/AUTO DIFF WBC: CPT

## 2023-11-30 PROCEDURE — 80053 COMPREHEN METABOLIC PANEL: CPT

## 2024-06-04 ENCOUNTER — HOSPITAL ENCOUNTER (OUTPATIENT)
Dept: CT IMAGING | Age: 74
Discharge: HOME | End: 2024-06-04
Attending: INTERNAL MEDICINE
Payer: MEDICARE

## 2024-06-04 ENCOUNTER — OFFICE VISIT (OUTPATIENT)
Dept: HEMATOLOGY/ONCOLOGY | Age: 74
End: 2024-06-04
Attending: INTERNAL MEDICINE
Payer: MEDICARE

## 2024-06-04 VITALS
OXYGEN SATURATION: 94 % | TEMPERATURE: 97 F | SYSTOLIC BLOOD PRESSURE: 128 MMHG | RESPIRATION RATE: 18 BRPM | HEART RATE: 83 BPM | HEIGHT: 64.06 IN | BODY MASS INDEX: 32.44 KG/M2 | WEIGHT: 190 LBS | DIASTOLIC BLOOD PRESSURE: 71 MMHG

## 2024-06-04 DIAGNOSIS — C34.91 BRONCHOGENIC CANCER OF RIGHT LUNG (HCC): Primary | ICD-10-CM

## 2024-06-04 DIAGNOSIS — D09.9 ADENOCARCINOMA IN SITU: ICD-10-CM

## 2024-06-04 DIAGNOSIS — R19.7 DIARRHEA, UNSPECIFIED TYPE: ICD-10-CM

## 2024-06-04 DIAGNOSIS — C34.91 BRONCHOGENIC CANCER OF RIGHT LUNG (HCC): ICD-10-CM

## 2024-06-04 DIAGNOSIS — R91.1 LUNG NODULE: ICD-10-CM

## 2024-06-04 DIAGNOSIS — D3A.090: ICD-10-CM

## 2024-06-04 PROCEDURE — 71260 CT THORAX DX C+: CPT | Performed by: INTERNAL MEDICINE

## 2024-06-04 PROCEDURE — 99214 OFFICE O/P EST MOD 30 MIN: CPT | Performed by: INTERNAL MEDICINE

## 2024-06-04 RX ORDER — MULTIVIT-MIN/IRON/FOLIC/HRB186 3.3 MG-25
1 TABLET ORAL DAILY
COMMUNITY

## 2024-06-04 NOTE — PROGRESS NOTES
Cancer Center Progress Note    Patient Name: Sarahi Osborne   YOB: 1950   Medical Record Number: [id number]   [facility]: [id number]   Attending Physician: Dinorah Olson M.D.   Referring Physician: Dr. 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 AIS involved the parenchymal margin s/p navigational bronchoscopy, RVATs with RLL wedge resection and mediastinal node dissection on 2/14/22. As no definite invasion was found and probably an in situ lesion only after discussion with the patient a wedge resection was pursued. Given GG nature of the lesion nothing was palpated at surgery and navigation system with methylene blue was used to vargas the area of the lesion.     - presented with a persistent cough back in 2019. She was placed on medications for her asthma as well as a saline nebulizer and PPI but cough persisted. She was seen by pulmonary. PFTs showed restrictive pattern with no significant post bronchodilator response. CT chest was done which showed a 5 mm solid nodule in the LLL. Cough eventually improved. She had a f/u CT scan to reevaluate nodule previously seen. This was done 11/17/21 which showed several subcentimeter lung nodules described as stable but also showed a ground glass nodule in the RLL which had increased from 0.6 mm to 1.1 cm.     Her CT images were reviewed at lung conference and consensus was to proceed with a biopsy. Biopsy was performed 12/8/21 by IR. There was a delay in getting final results back as was sent to [facility] for consultation. Ultimately they felt findings showed atypical alveolar proliferation favoring well differentiated adenocarcinoma.      History of Present Illness:   Had been having diarrhea for a couple of months. Follows with GI in [place]. W/u so far showed WBCs in stool and  was placed on Flagyl. Had scopes 1/2024 which showed esophagitis, polyp in stomach, diverticulosis and hemorrhoids. She says she feels pretty good otherwise. Has occasional right rib pain since lung surgery with mild MONTGOMERY and rare cough.  Appetite is great. No fevers.  Denies change in urinary habits, headaches, dizziness or visual symptoms.       Past Medical History:  Past Medical History:    COPD (chronic obstructive pulmonary disease) (HCC)    GERD (gastroesophageal reflux disease)    High blood pressure    High cholesterol    Hypertension    Nodule of lower lobe of right lung    Osteoarthritis       Past Surgical History:  Past Surgical History:   Procedure Laterality Date    Breast biopsy      Other      carpal tunnel    Total knee replacement         Family Medical History:  Family History   Problem Relation Age of Onset    Cancer Brother     Cancer Brother         colon       Gyne History:  OB History   No obstetric history on file.       Psychosocial History:  Social History     Socioeconomic History    Marital status:      Spouse name: Not on file    Number of children: Not on file    Years of education: Not on file    Highest education level: Not on file   Occupational History    Not on file   Tobacco Use    Smoking status: Never    Smokeless tobacco: Never   Vaping Use    Vaping status: Never Used   Substance and Sexual Activity    Alcohol use: Yes     Comment: maybe 1x monthly    Drug use: Never    Sexual activity: Not on file   Other Topics Concern    Not on file   Social History Narrative    Not on file     Social Determinants of Health     Financial Resource Strain: Low Risk  (2/20/2024)    Received from [facility]    Overall Financial Resource Strain (CARDIA)     Difficulty of Paying Living Expenses: Not hard at all   Food Insecurity: No Food Insecurity (2/20/2024)    Received from [facility]    Hunger Vital Sign     Worried  About Running Out of Food in the Last Year: Never true     Ran Out of Food in the Last Year: Never true   Transportation Needs: No Transportation Needs (2/20/2024)    [facility] from [facility]    PRAPARE - Transportation     Lack of Transportation (Medical): No     Lack of Transportation (Non-Medical): No   Physical Activity: Insufficiently Active (2/20/2024)    Received from [facility]    Exercise Vital Sign     Days of Exercise per Week: 2 days     Minutes of Exercise per Session: 30 min   Stress: No Stress Concern Present (2/20/2024)    Received from [facility]    [employer] - Occupational Stress Questionnaire     Feeling of Stress : Only a little   Social Connections: Moderately Isolated (2/20/2024)    Received from [facility]    Social Connection and Isolation Panel [NHANES]     Frequency of Communication with Friends and Family: Twice a week     Frequency of Social Gatherings with Friends and Family: Once a week     Attends Church Services: Never     Active Member of Clubs or Organizations: No     Attends Club or Organization Meetings: Never     Marital Status:    Housing Stability: Low Risk  (2/20/2024)    Received from [facility]    Housing Stability Vital Sign     Unable to Pay for Housing in the Last Year: No     Number of Places Lived in the Last Year: 1     In the last 12 months, was there a time when you did not have a steady place to sleep or slept in a shelter (including now)?: No       Allergies:   Allergies   Allergen Reactions    Amlodipine SWELLING    Prednisone HIVES    Lisinopril Coughing       Current Medications:    Current Outpatient Medications:     Meloxicam 15 MG Oral Tablet Dispersible, , Disp: , Rfl:     Esomeprazole Magnesium 20 MG Oral Capsule Delayed Release, , Disp: , Rfl:      rosuvastatin 20 MG Oral Tab, , Disp: , Rfl:     acetaminophen 500 MG Oral Tab, Take 1 tablet (500 mg total) by mouth every 4 (four) hours as needed., Disp: , Rfl:     Multiple Vitamin (MULTIVITAMINS OR), Take by mouth every morning., Disp: , Rfl:     Acidophilus/Pectin Oral Cap, Take 1 capsule by mouth daily., Disp: , Rfl:     Coenzyme Q10 (COQ10 OR), Take by mouth daily., Disp: , Rfl:     albuterol 108 (90 Base) MCG/ACT Inhalation Aero Soln, Inhale into the lungs every 6 (six) hours as needed for Wheezing., Disp: , Rfl:     atenolol 25 MG Oral Tab, Take 1 tablet (25 mg total) by mouth daily., Disp: , Rfl:     PYRIDOXINE HCL OR, Take by mouth every morning., Disp: , Rfl:     folic acid 1 MG Oral Tab, Take by mouth daily., Disp: , Rfl:     ALPRAZolam 0.25 MG Oral Tab, Take 1 tablet (0.25 mg total) by mouth 4 (four) times daily as needed., Disp: , Rfl:     Review of Systems:  A 14-point ROS was done with pertinent positives and negative per the HPI    Vital Signs:  /71 (BP Location: Left arm, Patient Position: Sitting, Cuff Size: large)   Pulse 83   Temp 97.1 °F (36.2 °C) (Tympanic)   Resp 18   Ht 1.627 m (5' 4.06\")   Wt 86.2 kg (190 lb)   SpO2 94%   BMI 32.56 kg/m²       Physical Examination:  General: Patient is alert and oriented x 3, not in acute distress.  HEENT: EOMs intact. PERRL. Oropharynx is clear.   Neck: No JVD. No palpable lymphadenopathy. Neck is supple.  Chest: Incision sites right well healed. CTA  Heart: Regular rate and rhythm.   Abdomen: Soft, non tender with good bowel sounds.  Extremities: Pedal pulses are present. No edema.  Neurological: Grossly intact.   Lymphatics: There is no palpable lymphadenopathy throughout in the cervical, supraclavicular, axillary, or inguinal regions.  Psych/Depression: Mood and affect are appropriate.    Laboratory:  Outside labs from 5/28/24 available in [facility] reviewed    Lab Component   Component Value Date/Time    RBC 4.57 11/30/2023 0936    RBC  4.69 06/01/2023 1024    RBC 4.55 12/01/2022 1024    HGB 13.3 11/30/2023 0936    HGB 13.4 06/01/2023 1024    HGB 13.4 12/01/2022 1024    HCT 42.5 11/30/2023 0936    HCT 42.0 06/01/2023 1024    HCT 42.2 12/01/2022 1024    MCV 93.0 11/30/2023 0936    MCV 89.6 06/01/2023 1024    MCV 92.7 12/01/2022 1024    MCH 29.1 11/30/2023 0936    MCH 28.6 06/01/2023 1024    MCH 29.5 12/01/2022 1024    MCHC 31.3 11/30/2023 0936    MCHC 31.9 06/01/2023 1024    MCHC 31.8 12/01/2022 1024    RDW 13.5 11/30/2023 0936    RDW 13.2 06/01/2023 1024    RDW 13.3 12/01/2022 1024    Neutrophil Absolute Prelim 4.42 11/30/2023 0936    Neutrophil Absolute Prelim 3.25 06/01/2023 1024    Neutrophil Absolute Prelim 4.27 12/01/2022 1024    WBC 7.8 11/30/2023 0936    WBC 6.0 06/01/2023 1024    WBC 7.5 12/01/2022 1024    .0 11/30/2023 0936    .0 06/01/2023 1024    .0 12/01/2022 1024       Lab Component   Component Value Date/Time    Glucose 98 11/30/2023 0936    Glucose 106 (H) 06/01/2023 1024    Glucose 96 12/01/2022 1024    BUN 15 11/30/2023 0936    BUN 19 (H) 06/01/2023 1024    BUN 16 12/01/2022 1024    Creatinine 0.70 11/30/2023 0936    Creatinine 0.67 06/01/2023 1024    Creatinine 0.56 12/01/2022 1024    GFR, -American 101 06/02/2022 1033    GFR, -American 110 02/15/2022 0624    GFR, -American 107 01/26/2022 1636    GFR, Non- 88 06/02/2022 1033    GFR, Non- 96 02/15/2022 0624    GFR, Non- 93 01/26/2022 1636    Calcium, Total 9.3 11/30/2023 0936    Calcium, Total 9.0 06/01/2023 1024    Calcium, Total 8.9 12/01/2022 1024    Albumin 3.5 11/30/2023 0936    Albumin 3.9 06/01/2023 1024    Albumin 3.3 (L) 12/01/2022 1024    Sodium 141 11/30/2023 0936    Sodium 141 06/01/2023 1024    Sodium 141 12/01/2022 1024    Potassium 4.3 11/30/2023 0936    Potassium 4.6 06/01/2023 1024    Potassium 4.3 12/01/2022 1024    Chloride 108 11/30/2023 0936    Chloride 111 06/01/2023 1024     Chloride 111 12/01/2022 1024    CO2 32.0 11/30/2023 0936    CO2 27.0 06/01/2023 1024    CO2 29.0 12/01/2022 1024    Alkaline Phosphatase 41 (L) 11/30/2023 0936    Alkaline Phosphatase 41 (L) 06/01/2023 1024    Alkaline Phosphatase 42 (L) 12/01/2022 1024    AST 15 11/30/2023 0936    AST 19 06/01/2023 1024    AST 11 (L) 12/01/2022 1024    ALT 28 11/30/2023 0936    ALT 32 06/01/2023 1024    ALT 26 12/01/2022 1024    Bilirubin, Total 0.5 11/30/2023 0936    Bilirubin, Total 0.4 06/01/2023 1024    Bilirubin, Total 0.3 12/01/2022 1024    Total Protein 6.4 11/30/2023 0936    Total Protein 6.8 06/01/2023 1024    Total Protein 6.3 (L) 12/01/2022 1024             Radiology:  PROCEDURE:  CT CHEST(CONTRAST ONLY) (CPT=71260)     COMPARISON:  [facility], CT, CT CHEST(CONTRAST ONLY) (CPT=71260), 11/30/2023, 9:46 AM.     INDICATIONS:  C34.91 Bronchogenic cancer of right lung (HCC)     TECHNIQUE:  CT images were obtained with non-ionic intravenous contrast material. Dose reduction techniques were used. Dose information is transmitted to the ACR (American College of Radiology) NRDR (National Radiology Data Registry) which includes the  Dose Index Registry.     PATIENT STATED HISTORY:(As transcribed by Technologist)  Disease surveillance due to history of Lung Cancer.      CONTRAST USED:  75cc of Isovue 370     FINDINGS:    LUNGS:  There is no significant change in a 4-5 mm micro nodule of the right middle lobe on series 3, image 82. A 3-4 mm micro nodule of the left lower lobe on image 79 is also essentially unchanged.  Postsurgical changes of the chest are noted.  Motion  artifact slightly limits evaluation.  No new focal consolidation is seen.  No new nodules are identified.  VASCULATURE:  No visible pulmonary arterial thrombus or attenuation.    YONG:  No mass or adenopathy.    MEDIASTINUM:  No mass or adenopathy.    CARDIAC:  No enlargement, pericardial thickening, or significant coronary artery  calcification.  PLEURA:  No mass or effusion.    THORACIC AORTA:  Thoracic aorta measures up to 3.8 cm in diameter which is similar to the prior exam.  No discrete aneurysm is seen.  CHEST WALL:  No mass or axillary adenopathy.    LIMITED ABDOMEN:  Scattered probable cysts noted within the liver are essentially unchanged when compared with prior exams.  BONES:  Stable appearance of multilevel degenerative changes.                   Impression   CONCLUSION:  No significant change in small micro nodules measuring up to 5 mm for which continued follow-up is suggested.  No new nodules are identified.  No evidence of lymphadenopathy within the chest.        LOCATION:  [facility]        Dictated by (CST): Carrington Doyle MD on 6/04/2024 at 11:13 AM      Finalized by (CST): Carrington Doyle MD on 6/04/2024 at 11:19 AM      PROCEDURE:  CT CHEST(CONTRAST ONLY) (CPT=71260)     COMPARISON:  [facility] , CT, CT CHEST(CONTRAST ONLY) (CPT=71260), 6/01/2023, 10:54 AM.     INDICATIONS:  C34.91 Bronchogenic cancer of right lung (HCC)     TECHNIQUE:  CT images were obtained with non-ionic intravenous contrast material. Dose reduction techniques were used. Dose information is transmitted to the ACR (American College of Radiology) NRDR (National Radiology Data Registry) which includes the  Dose Index Registry.     PATIENT STATED HISTORY:(As transcribed by Technologist)  Disease surveillance due to history of lung cancer.Patient denies any chest complaints.      CONTRAST USED:  75cc of Omnipaque 350     FINDINGS:    LUNGS:  5 millimeter right middle lobe pulmonary nodule is noted (image 94), stable.  3 millimeter left lower lobe pulmonary nodule is noted (image 87), not significantly changed.  Stable postsurgical changes are noted in the right lower lobe status post   wedge resection.  VASCULATURE:  No visible pulmonary arterial thrombus or attenuation.    YONG:  No mass or adenopathy.    MEDIASTINUM:  No mass or adenopathy.    CARDIAC:  No enlargement,  pericardial thickening, or significant coronary artery calcification.  PLEURA:  No mass or effusion.    THORACIC AORTA:  Ectasia of the ascending aorta measures up to 3.9 cm, stable.    CHEST WALL:  No mass or axillary adenopathy.    LIMITED ABDOMEN:  Limited images of the upper abdomen demonstrates cysts within the liver.    BONES:  No bony lesion or fracture.                     Impression   CONCLUSION:    1. Stable right middle and left lower lobe pulmonary nodules.  2. Stable postsurgical changes in the right lower lobe.  3. Stable ectasia of the ascending aorta measuring up to 3.9 cm.        LOCATION:  [place]        Dictated by (CST): Hilario Riley MD on 11/30/2023 at 10:35 AM      Finalized by (CST): Hilario Riley MD on 11/30/2023 at 10:40 AM       Impression & Plan:  1. AIS RLL- s/p resection with incidental carcinoid tumorlet seen. Very early stage lesion with no evidence of invasion. Discussed at lung conference- though in situ carcinoma at parenchymal margin, as lesion non-invasive no further surgery indicated and will monitor closely. CT just done showed no overt evidence of recurrence with stable findings.     2. Incidental carcinoid tumorlet- found after resection. Ongoing diarrhea unlikely from carcinoid syndrome but given history may be prudent to check chromogranin. See below.     3. Lung nodules- will monitor with surveillance scans. Stable as well with no new nodules seen.     4. 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 Monitor    5. Diarrhea- ongoing for several months. GI w/u so far unrevealing except for WBCs in the stool and was treated with flagyl. Ongoing diarrhea unlikely from carcinoid syndrome but given history may be prudent to check chromogranin and abdominal imaging. They said GI has scans planned.     Reviewed scans and labs with her and  in detail.     Scans yearly from my standpoint (June)    RTC 6 months.     Dinorah Olson MD  [facility]

## 2024-06-04 NOTE — PROGRESS NOTES
Here for 6mth MD fu visit   Reports right rib pain, not constant  Continues to have diarrhea, has fu w GI  No CP, No SOB  No other complaints  CT scan done today  Education Record     Learner:  Patient     Disease / Diagnosis:      Barriers / Limitations:                  Comments:     Method:  Discussion                Comments:     General Topics:  Plan of care reviewed                Comments:     Outcome:  Shows understanding                Comments:

## 2024-12-02 ENCOUNTER — TELEPHONE (OUTPATIENT)
Dept: HEMATOLOGY/ONCOLOGY | Facility: HOSPITAL | Age: 74
End: 2024-12-02

## 2024-12-02 NOTE — TELEPHONE ENCOUNTER
Patient called to cancel her 12/5 appt because her  is in the hospital in [place]. I did reschedule for a future date. Her  is going to need daily antibiotics. Thank you burt

## 2024-12-05 ENCOUNTER — APPOINTMENT (OUTPATIENT)
Dept: HEMATOLOGY/ONCOLOGY | Facility: HOSPITAL | Age: 74
End: 2024-12-05
Attending: INTERNAL MEDICINE
Payer: MEDICARE

## 2024-12-19 ENCOUNTER — OFFICE VISIT (OUTPATIENT)
Age: 74
End: 2024-12-19
Attending: INTERNAL MEDICINE
Payer: MEDICARE

## 2024-12-19 VITALS
HEART RATE: 64 BPM | HEIGHT: 64.06 IN | DIASTOLIC BLOOD PRESSURE: 75 MMHG | WEIGHT: 183 LBS | TEMPERATURE: 97 F | SYSTOLIC BLOOD PRESSURE: 125 MMHG | RESPIRATION RATE: 16 BRPM | OXYGEN SATURATION: 99 % | BODY MASS INDEX: 31.24 KG/M2

## 2024-12-19 DIAGNOSIS — D3A.090: ICD-10-CM

## 2024-12-19 DIAGNOSIS — R19.7 DIARRHEA, UNSPECIFIED TYPE: ICD-10-CM

## 2024-12-19 DIAGNOSIS — C7A.090 MALIGNANT CARCINOID TUMOR OF LUNG (HCC): Primary | ICD-10-CM

## 2024-12-19 DIAGNOSIS — K59.1 FUNCTIONAL DIARRHEA: ICD-10-CM

## 2024-12-19 DIAGNOSIS — C34.91 BRONCHOGENIC CANCER OF RIGHT LUNG (HCC): ICD-10-CM

## 2024-12-19 RX ORDER — MESALAMINE 1.2 G/1
2.4 TABLET, DELAYED RELEASE ORAL 2 TIMES DAILY
COMMUNITY

## 2024-12-19 NOTE — PROGRESS NOTES
Cancer Center Progress Note    Patient Name: Sarahi Osborne   YOB: 1950   Medical Record Number: [id number]   CSN: [id number]   Attending Physician: Dinorah Olson M.D.   Referring Physician: Dr Cally Mortensen    Date of Visit: 12/19/2024     Chief Complaint:  Chief Complaint   Patient presents with    Follow - Up     Pt here for 6 month f/u. Energy is okay, denies SOB, has chronic cough. Appetite good. Diarrhea improved with mesalamine. Following with GI.         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 AIS involved the parenchymal margin s/p navigational bronchoscopy, RVATs with RLL wedge resection and mediastinal node dissection on 2/14/22. As no definite invasion was found and probably an in situ lesion only after discussion with the patient a wedge resection was pursued. Given GG nature of the lesion nothing was palpated at surgery and navigation system with methylene blue was used to vargas the area of the lesion.     - presented with a persistent cough back in 2019. She was placed on medications for her asthma as well as a saline nebulizer and PPI but cough persisted. She was seen by pulmonary. PFTs showed restrictive pattern with no significant post bronchodilator response. CT chest was done which showed a 5 mm solid nodule in the LLL. Cough eventually improved. She had a f/u CT scan to reevaluate nodule previously seen. This was done 11/17/21 which showed several subcentimeter lung nodules described as stable but also showed a ground glass nodule in the RLL which had increased from 0.6 mm to 1.1 cm.     Her CT images were reviewed at lung conference and consensus was to proceed with a biopsy. Biopsy was performed 12/8/21 by IR. There was a delay in getting final results back as was sent to [facility] for consultation. Ultimately they felt findings showed atypical alveolar proliferation favoring well differentiated  adenocarcinoma.      History of Present Illness:   Underwent w/u for diarrhea with GI in [place]. W/u showed WBCs in stool and was placed on Flagyl. Had scopes 1/2024 which showed esophagitis, polyp in stomach, diverticulosis and hemorrhoids. Calprotectin came back elevated and was told she had colitis. As has reported allergy to Bentyl and prednisone (rash for both)  was placed on mesalamine. She said on this diarrhea has improved significantly. As part of er w/u for diarrhea chromogranin was drawn which came back elevated at 379. Has occasional right rib pain since lung surgery with mild [place] and rare cough.  Appetite is good. No fevers.  Denies change in urinary habits, headaches, dizziness or visual symptoms.       Past Medical History:  Past Medical History:    Colitis    COPD (chronic obstructive pulmonary disease) (HCC)    GERD (gastroesophageal reflux disease)    High blood pressure    High cholesterol    Hypertension    Nodule of lower lobe of right lung    Osteoarthritis       Past Surgical History:  Past Surgical History:   Procedure Laterality Date    Breast biopsy      Other      carpal tunnel    Total knee replacement         Family Medical History:  Family History   Problem Relation Age of Onset    Cancer Brother     Cancer Brother         colon       Gyne History:  OB History   No obstetric history on file.       Psychosocial History:  Social History     Socioeconomic History    Marital status:      Spouse name: Not on file    Number of children: Not on file    Years of education: Not on file    Highest education level: Not on file   Occupational History    Not on file   Tobacco Use    Smoking status: Never    Smokeless tobacco: Never   Vaping Use    Vaping status: Never Used   Substance and Sexual Activity    Alcohol use: Yes     Comment: maybe 1x monthly    Drug use: Never    Sexual activity: Not on file   Other Topics Concern    Not on file   Social History Narrative    Not on file     Social  Drivers of Health     Financial Resource Strain: Low Risk  (9/23/2024)    Received from [facility]    Overall Financial Resource Strain (CARDIA)     Difficulty of Paying Living Expenses: Not hard at all   Food Insecurity: No Food Insecurity (9/23/2024)    Received from [facility]    Hunger Vital Sign     Worried About Running Out of Food in the Last Year: Never true     Ran Out of Food in the Last Year: Never true   Transportation Needs: No Transportation Needs (9/23/2024)    Received from [facility]    PRAPARE - Transportation     Lack of Transportation (Medical): No     Lack of Transportation (Non-Medical): No   Physical Activity: Insufficiently Active (9/23/2024)    Received from [facility]    Exercise Vital Sign     Days of Exercise per Week: 3 days     Minutes of Exercise per Session: 20 min   Stress: Stress Concern Present (9/23/2024)    Received from [facility]    [employer] - Occupational Stress Questionnaire     Feeling of Stress : To some extent   Social Connections: Moderately Isolated (9/23/2024)    Received from [facility]    Social Connection and Isolation Panel [NHANES]     Frequency of Communication with Friends and Family: Three times a week     Frequency of Social Gatherings with Friends and Family: Once a week     Attends Latter-day Services: Never     Active Member of Clubs or Organizations: No     Attends Club or Organization Meetings: Never     Marital Status:    Housing Stability: Unknown (9/23/2024)    Received from [facility]    Housing Stability Vital Sign     Unable to Pay for Housing in the Last Year: No     Number of Times Moved in the Last Year: Not on file     Homeless in the Last Year: No       Allergies:    Allergies   Allergen Reactions    Amlodipine SWELLING    Prednisone HIVES    Lisinopril Coughing       Current Medications:    Current Outpatient Medications:     mesalamine 1.2 g Oral Tab EC, Take 2 tablets (2.4 g total) by mouth 2 (two) times daily., Disp: , Rfl:     FIBER ADULT GUMMIES OR, Take 1 tablet by mouth daily., Disp: , Rfl:     Multiple Vitamins-Minerals (HAIR SKIN & NAILS ADVANCED) Oral Tab, Take 1 tablet by mouth daily., Disp: , Rfl:     Esomeprazole Magnesium 20 MG Oral Capsule Delayed Release, , Disp: , Rfl:     rosuvastatin 20 MG Oral Tab, , Disp: , Rfl:     acetaminophen 500 MG Oral Tab, Take 1 tablet (500 mg total) by mouth every 4 (four) hours as needed., Disp: , Rfl:     Multiple Vitamin (MULTIVITAMINS OR), Take by mouth every morning., Disp: , Rfl:     Acidophilus/Pectin Oral Cap, Take 1 capsule by mouth daily., Disp: , Rfl:     albuterol 108 (90 Base) MCG/ACT Inhalation Aero Soln, Inhale into the lungs every 6 (six) hours as needed for Wheezing., Disp: , Rfl:     atenolol 25 MG Oral Tab, Take 1 tablet (25 mg total) by mouth daily., Disp: , Rfl:     ALPRAZolam 0.25 MG Oral Tab, Take 1 tablet (0.25 mg total) by mouth 4 (four) times daily as needed., Disp: , Rfl:     Review of Systems:  A 14-point ROS was done with pertinent positives and negative per the HPI    Vital Signs:  /75 (BP Location: Left arm, Patient Position: Sitting, Cuff Size: large)   Pulse 64   Temp 97.4 °F (36.3 °C) (Tympanic)   Resp 16   Ht 1.627 m (5' 4.06\")   Wt 83 kg (183 lb)   SpO2 99%   BMI 31.36 kg/m²       Physical Examination:  General: Patient is alert and oriented x 3, not in acute distress.  HEENT: EOMs intact. PERRL. Oropharynx is clear.   Neck: No JVD. No palpable lymphadenopathy. Neck is supple.  Chest: Incision sites right well healed. CTA  Heart: Regular rate and rhythm.   Abdomen: Soft, non tender with good bowel sounds.  Extremities: Pedal pulses are present. No edema.  Neurological: Grossly  intact.   Lymphatics: There is no palpable lymphadenopathy throughout in the cervical, supraclavicular, axillary, or inguinal regions.  Psych/Depression: Mood and affect are appropriate.    Laboratory:  Outside labs reviewed.     Lab Results   Component Value Date    WBC 7.8 11/30/2023    RBC 4.57 11/30/2023    HGB 13.3 11/30/2023    HCT 42.5 11/30/2023    .0 11/30/2023    MCV 93.0 11/30/2023    MCH 29.1 11/30/2023    MCHC 31.3 11/30/2023    RDW 13.5 11/30/2023    NEPRELIM 4.42 11/30/2023    NEPERCENT 57.0 11/30/2023    LYPERCENT 33.8 11/30/2023    MOPERCENT 6.0 11/30/2023    EOPERCENT 1.9 11/30/2023    BAPERCENT 1.0 11/30/2023    NE 4.42 11/30/2023    LYMABS 2.63 11/30/2023    MOABSO 0.47 11/30/2023    EOABSO 0.15 11/30/2023    BAABSO 0.08 11/30/2023       Lab Component   Component Value Date/Time    Glucose 98 11/30/2023 0936    Glucose 106 (H) 06/01/2023 1024    Glucose 96 12/01/2022 1024    BUN 15 11/30/2023 0936    BUN 19 (H) 06/01/2023 1024    BUN 16 12/01/2022 1024    Creatinine 0.70 11/30/2023 0936    Creatinine 0.67 06/01/2023 1024    Creatinine 0.56 12/01/2022 1024    GFR, -American 101 06/02/2022 1033    GFR, -American 110 02/15/2022 0624    GFR, -American 107 01/26/2022 1636    GFR, Non- 88 06/02/2022 1033    GFR, Non- 96 02/15/2022 0624    GFR, Non- 93 01/26/2022 1636    Calcium, Total 9.3 11/30/2023 0936    Calcium, Total 9.0 06/01/2023 1024    Calcium, Total 8.9 12/01/2022 1024    Albumin 3.5 11/30/2023 0936    Albumin 3.9 06/01/2023 1024    Albumin 3.3 (L) 12/01/2022 1024    Sodium 141 11/30/2023 0936    Sodium 141 06/01/2023 1024    Sodium 141 12/01/2022 1024    Potassium 4.3 11/30/2023 0936    Potassium 4.6 06/01/2023 1024    Potassium 4.3 12/01/2022 1024    Chloride 108 11/30/2023 0936    Chloride 111 06/01/2023 1024    Chloride 111 12/01/2022 1024    CO2 32.0 11/30/2023 0936    CO2 27.0 06/01/2023 1024    CO2 29.0  12/01/2022 1024    Alkaline Phosphatase 41 (L) 11/30/2023 0936    Alkaline Phosphatase 41 (L) 06/01/2023 1024    Alkaline Phosphatase 42 (L) 12/01/2022 1024    AST 15 11/30/2023 0936    AST 19 06/01/2023 1024    AST 11 (L) 12/01/2022 1024    ALT 28 11/30/2023 0936    ALT 32 06/01/2023 1024    ALT 26 12/01/2022 1024    Bilirubin, Total 0.5 11/30/2023 0936    Bilirubin, Total 0.4 06/01/2023 1024    Bilirubin, Total 0.3 12/01/2022 1024    Total Protein 6.4 11/30/2023 0936    Total Protein 6.8 06/01/2023 1024    Total Protein 6.3 (L) 12/01/2022 1024       CHROMOGRANIN A  <93 ng/mL 379 High          Radiology:  PROCEDURE:  CT CHEST(CONTRAST ONLY) (CPT=71260)     COMPARISON:  [facility], CT, CT CHEST(CONTRAST ONLY) (CPT=71260), 11/30/2023, 9:46 AM.     INDICATIONS:  C34.91 Bronchogenic cancer of right lung (HCC)     TECHNIQUE:  CT images were obtained with non-ionic intravenous contrast material. Dose reduction techniques were used. Dose information is transmitted to the ACR (American College of Radiology) NRDR (National Radiology Data Registry) which includes the  Dose Index Registry.     PATIENT STATED HISTORY:(As transcribed by Technologist)  Disease surveillance due to history of Lung Cancer.      CONTRAST USED:  75cc of Isovue 370     FINDINGS:    LUNGS:  There is no significant change in a 4-5 mm micro nodule of the right middle lobe on series 3, image 82. A 3-4 mm micro nodule of the left lower lobe on image 79 is also essentially unchanged.  Postsurgical changes of the chest are noted.  Motion  artifact slightly limits evaluation.  No new focal consolidation is seen.  No new nodules are identified.  VASCULATURE:  No visible pulmonary arterial thrombus or attenuation.    YONG:  No mass or adenopathy.    MEDIASTINUM:  No mass or adenopathy.    CARDIAC:  No enlargement, pericardial thickening, or significant coronary artery calcification.  PLEURA:  No mass or effusion.    THORACIC AORTA:  Thoracic  aorta measures up to 3.8 cm in diameter which is similar to the prior exam.  No discrete aneurysm is seen.  CHEST WALL:  No mass or axillary adenopathy.    LIMITED ABDOMEN:  Scattered probable cysts noted within the liver are essentially unchanged when compared with prior exams.  BONES:  Stable appearance of multilevel degenerative changes.                   Impression   CONCLUSION:  No significant change in small micro nodules measuring up to 5 mm for which continued follow-up is suggested.  No new nodules are identified.  No evidence of lymphadenopathy within the chest.        LOCATION:  [place]        Dictated by (CST): Carrington Doyle MD on 6/04/2024 at 11:13 AM      Finalized by (CST): Carrington Doyle MD on 6/04/2024 at 11:19 AM         PROCEDURE:  CT CHEST(CONTRAST ONLY) (CPT=71260)     COMPARISON:  [facility], CT, CT CHEST(CONTRAST ONLY) (CPT=71260), 11/30/2023, 9:46 AM.     INDICATIONS:  C34.91 Bronchogenic cancer of right lung (HCC)     TECHNIQUE:  CT images were obtained with non-ionic intravenous contrast material. Dose reduction techniques were used. Dose information is transmitted to the ACR (American College of Radiology) NRDR (National Radiology Data Registry) which includes the  Dose Index Registry.     PATIENT STATED HISTORY:(As transcribed by Technologist)  Disease surveillance due to history of Lung Cancer.      CONTRAST USED:  75cc of Isovue 370     FINDINGS:    LUNGS:  There is no significant change in a 4-5 mm micro nodule of the right middle lobe on series 3, image 82. A 3-4 mm micro nodule of the left lower lobe on image 79 is also essentially unchanged.  Postsurgical changes of the chest are noted.  Motion  artifact slightly limits evaluation.  No new focal consolidation is seen.  No new nodules are identified.  VASCULATURE:  No visible pulmonary arterial thrombus or attenuation.    YONG:  No mass or adenopathy.    MEDIASTINUM:  No mass or adenopathy.    CARDIAC:  No enlargement,  pericardial thickening, or significant coronary artery calcification.  PLEURA:  No mass or effusion.    THORACIC AORTA:  Thoracic aorta measures up to 3.8 cm in diameter which is similar to the prior exam.  No discrete aneurysm is seen.  CHEST WALL:  No mass or axillary adenopathy.    LIMITED ABDOMEN:  Scattered probable cysts noted within the liver are essentially unchanged when compared with prior exams.  BONES:  Stable appearance of multilevel degenerative changes.                   Impression   CONCLUSION:  No significant change in small micro nodules measuring up to 5 mm for which continued follow-up is suggested.  No new nodules are identified.  No evidence of lymphadenopathy within the chest.        LOCATION:  [place]        Dictated by (CST): Carrington Doyle MD on 6/04/2024 at 11:13 AM      Finalized by (CST): Carrington Doyle MD on 6/04/2024 at 11:19 AM      Abdomen -- Computed Tomography     Impression    IMPRESSION:  1.  No evidence of acute process in the abdomen or pelvis. No specific findings on this study to account for the patient's symptoms.  2.  Chronic findings as above, including: Radiologically uncomplicated sigmoid diverticulosis. Hepatic cysts. Osseous degenerative changes.  Narrative    DICTATING PHYSICIAN:  Aren Mohan M.D.    EXAM:   CT ABDOMEN PELVIS W/ CONTRAST    INDICATION: Diarrhea past four months, history of lung cancer    PROCEDURE: CT of the abdomen and pelvis was obtained following administration of 80 cc Isovue 300 IV contrast. Oral contrast was utilized. [employer]imetrics Dose Report: Radiation dose reduction techniques were employed. CTDIvol: 12.1 - 16.4 mGy. DLP: 1384 mGy-cm.    COMPARISON: CT chest 6/1/2023 (images only, report for this study is not available at this time), right upper quadrant ultrasound 10/18/2017    FINDINGS:    Lower chest: Subsegmental atelectasis/scarring in the lung bases. Mitral annular calcifications.    ABDOMEN:  Liver: Several hepatic cysts, measuring up 5.0 cm in  the posterior medial right lobe. Additional subcentimeter hepatic lesions which are two small to characterize but also most likely cysts and/or hemangiomas. No suspicious lesions.  Gallbladder: No significant CT abnormality. No pathologic biliary dilation.  Spleen: No significant abnormality.  Pancreas: No significant abnormality.  Adrenals: No significant abnormality.  Kidneys: No significant abnormality.  Aorta: Mild atherosclerosis.  IVC: No significant abnormality.  Retroperitoneum/Lymph nodes: No fluid collections in the retroperitoneum. No pathologically enlarged lymph nodes in the abdomen.  Stomach, duodenum, and visualized esophagus: No significant abnormality.    PELVIS:  Small bowel: No significant abnormality.  Appendix: The appendix is identified in the right lower quadrant, normal in appearance.  Colon: There is diverticulosis of the sigmoid colon without associated fat stranding or fluid to suggest diverticulitis.  Mesentery: No significant abnormality.  Peritoneum: No free fluid or air.  Reproductive organs: No significant abnormality.  Bladder: Incompletely distended, compromising evaluation.  Lymph nodes: No pathologically enlarged lymph nodes in the pelvis.  Body wall: No significant abnormality.    Bones: Degenerative changes of the dorsal spine and hips. Occasional bone island. No destructive lesions.      Impression & Plan:  1. AIS RLL- s/p resection with incidental carcinoid tumorlet seen. Very early stage lesion with no evidence of invasion. Discussed at lung conference- though in situ carcinoma at parenchymal margin, as lesion non-invasive no further surgery indicated and will monitor closely. CT just done showed no overt evidence of recurrence with stable findings.     2. Incidental carcinoid tumorlet- found after resection. Ongoing diarrhea unlikely from carcinoid syndrome but given history checked chromogranin which came back elevated. [facility] (neuroendocrine) PET ordered.     3. Lung  nodules- will monitor with surveillance scans. Stable as well with no new nodules seen.     4. 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 Monitor    5. Diarrhea-extensive w/u by GI showed elevated calprotectin and felt to have inflammatory colitis. Better on mesalamine. Unlikely from carcinoid syndrome but given history chromogranin and abdominal imaging ordered. Chromogranin elevated, CT abdomen showed no acute process. Neuroendocrine PET ordered.     Reviewed scans and labs with her and  in detail.     Scans yearly from my standpoint (June) for (1)  Await neuroendocrine PET to determine next steps    RTC 6 months.     MD [facility]

## 2025-01-03 ENCOUNTER — HOSPITAL ENCOUNTER (OUTPATIENT)
Dept: NUCLEAR MEDICINE | Facility: HOSPITAL | Age: 75
Discharge: HOME | End: 2025-01-03
Attending: INTERNAL MEDICINE
Payer: MEDICARE

## 2025-01-03 DIAGNOSIS — K59.1 FUNCTIONAL DIARRHEA: ICD-10-CM

## 2025-01-03 DIAGNOSIS — C7A.090 MALIGNANT CARCINOID TUMOR OF LUNG (HCC): ICD-10-CM

## 2025-01-03 PROCEDURE — 78815 PET IMAGE W/CT SKULL-THIGH: CPT | Performed by: INTERNAL MEDICINE

## 2025-05-16 ENCOUNTER — TELEPHONE (OUTPATIENT)
Age: 75
End: 2025-05-16

## 2025-05-16 NOTE — TELEPHONE ENCOUNTER
Pt called she would like to speak with a nurse in regards to her CT scan that's 6/5      Please give pt a call back     Thank you

## 2025-06-05 ENCOUNTER — HOSPITAL ENCOUNTER (OUTPATIENT)
Dept: CT IMAGING | Age: 75
Discharge: HOME | End: 2025-06-05
Attending: INTERNAL MEDICINE
Payer: MEDICARE

## 2025-06-05 ENCOUNTER — LAB ENCOUNTER (OUTPATIENT)
Dept: LAB | Age: 75
End: 2025-06-05
Attending: INTERNAL MEDICINE
Payer: MEDICARE

## 2025-06-05 DIAGNOSIS — C34.91 BRONCHOGENIC CANCER OF RIGHT LUNG (HCC): ICD-10-CM

## 2025-06-05 DIAGNOSIS — R19.7 DIARRHEA, UNSPECIFIED TYPE: ICD-10-CM

## 2025-06-05 DIAGNOSIS — K59.1 FUNCTIONAL DIARRHEA: ICD-10-CM

## 2025-06-05 LAB
ALBUMIN SERPL-MCNC: 4.4 G/DL (ref 3.2–4.8)
ALBUMIN/GLOB SERPL: 2.1 {RATIO} (ref 1–2)
ALP LIVER SERPL-CCNC: 32 U/L (ref 55–142)
ALT SERPL-CCNC: 22 U/L (ref 10–49)
ANION GAP SERPL CALC-SCNC: 8 MMOL/L (ref 0–18)
AST SERPL-CCNC: 26 U/L (ref ?–34)
BASOPHILS # BLD AUTO: 0.07 X10(3) UL (ref 0–0.2)
BASOPHILS NFR BLD AUTO: 1.1 %
BILIRUB SERPL-MCNC: 0.3 MG/DL (ref 0.2–1.1)
BUN BLD-MCNC: 10 MG/DL (ref 9–23)
CALCIUM BLD-MCNC: 9.4 MG/DL (ref 8.7–10.6)
CHLORIDE SERPL-SCNC: 107 MMOL/L (ref 98–112)
CO2 SERPL-SCNC: 27 MMOL/L (ref 21–32)
CREAT BLD-MCNC: 0.59 MG/DL (ref 0.55–1.02)
CREAT BLD-MCNC: 0.6 MG/DL (ref 0.55–1.02)
EGFRCR SERPLBLD CKD-EPI 2021: 94 ML/MIN/1.73M2 (ref 60–?)
EGFRCR SERPLBLD CKD-EPI 2021: 94 ML/MIN/1.73M2 (ref 60–?)
EOSINOPHIL # BLD AUTO: 0.06 X10(3) UL (ref 0–0.7)
EOSINOPHIL NFR BLD AUTO: 0.9 %
ERYTHROCYTE [DISTWIDTH] IN BLOOD BY AUTOMATED COUNT: 13.5 %
FASTING STATUS PATIENT QL REPORTED: YES
GLOBULIN PLAS-MCNC: 2.1 G/DL (ref 2–3.5)
GLUCOSE BLD-MCNC: 91 MG/DL (ref 70–99)
HCT VFR BLD AUTO: 42.4 % (ref 35–48)
HGB BLD-MCNC: 13.1 G/DL (ref 12–16)
IMM GRANULOCYTES # BLD AUTO: 0.02 X10(3) UL (ref 0–1)
IMM GRANULOCYTES NFR BLD: 0.3 %
LDH SERPL L TO P-CCNC: 277 U/L (ref 120–246)
LYMPHOCYTES # BLD AUTO: 1.94 X10(3) UL (ref 1–4)
LYMPHOCYTES NFR BLD AUTO: 29.3 %
MCH RBC QN AUTO: 28.7 PG (ref 26–34)
MCHC RBC AUTO-ENTMCNC: 30.9 G/DL (ref 31–37)
MCV RBC AUTO: 92.8 FL (ref 80–100)
MONOCYTES # BLD AUTO: 0.54 X10(3) UL (ref 0.1–1)
MONOCYTES NFR BLD AUTO: 8.2 %
NEUTROPHILS # BLD AUTO: 3.99 X10 (3) UL (ref 1.5–7.7)
NEUTROPHILS # BLD AUTO: 3.99 X10(3) UL (ref 1.5–7.7)
NEUTROPHILS NFR BLD AUTO: 60.2 %
OSMOLALITY SERPL CALC.SUM OF ELEC: 293 MOSM/KG (ref 275–295)
PLATELET # BLD AUTO: 224 10(3)UL (ref 150–450)
POTASSIUM SERPL-SCNC: 4.4 MMOL/L (ref 3.5–5.1)
PROT SERPL-MCNC: 6.5 G/DL (ref 5.7–8.2)
RBC # BLD AUTO: 4.57 X10(6)UL (ref 3.8–5.3)
SODIUM SERPL-SCNC: 142 MMOL/L (ref 136–145)
WBC # BLD AUTO: 6.6 X10(3) UL (ref 4–11)

## 2025-06-05 PROCEDURE — 86316 IMMUNOASSAY TUMOR OTHER: CPT

## 2025-06-05 PROCEDURE — 85025 COMPLETE CBC W/AUTO DIFF WBC: CPT

## 2025-06-05 PROCEDURE — 74160 CT ABDOMEN W/CONTRAST: CPT | Performed by: INTERNAL MEDICINE

## 2025-06-05 PROCEDURE — 71260 CT THORAX DX C+: CPT | Performed by: INTERNAL MEDICINE

## 2025-06-05 PROCEDURE — 36415 COLL VENOUS BLD VENIPUNCTURE: CPT

## 2025-06-05 PROCEDURE — 83615 LACTATE (LD) (LDH) ENZYME: CPT

## 2025-06-05 PROCEDURE — 80053 COMPREHEN METABOLIC PANEL: CPT

## 2025-06-05 PROCEDURE — 82565 ASSAY OF CREATININE: CPT

## 2025-06-05 RX ORDER — IOHEXOL 350 MG/ML
100 INJECTION, SOLUTION INTRAVENOUS
Status: COMPLETED | OUTPATIENT
Start: 2025-06-05 | End: 2025-06-05

## 2025-06-05 RX ADMIN — IOHEXOL 100 ML: 350 INJECTION, SOLUTION INTRAVENOUS at 10:10:00

## 2025-06-08 LAB — CHROMOGRANIN A: 230.7 NG/ML

## 2025-06-19 ENCOUNTER — OFFICE VISIT (OUTPATIENT)
Age: 75
End: 2025-06-19
Attending: INTERNAL MEDICINE
Payer: MEDICARE

## 2025-06-19 VITALS
SYSTOLIC BLOOD PRESSURE: 142 MMHG | TEMPERATURE: 98 F | OXYGEN SATURATION: 97 % | RESPIRATION RATE: 16 BRPM | BODY MASS INDEX: 31.84 KG/M2 | HEIGHT: 64.06 IN | DIASTOLIC BLOOD PRESSURE: 89 MMHG | HEART RATE: 71 BPM | WEIGHT: 186.5 LBS

## 2025-06-19 DIAGNOSIS — H20.9 UVEITIS: ICD-10-CM

## 2025-06-19 DIAGNOSIS — G89.12 POST-THORACOTOMY PAIN: ICD-10-CM

## 2025-06-19 DIAGNOSIS — R91.1 LUNG NODULE: ICD-10-CM

## 2025-06-19 DIAGNOSIS — D3A.090: ICD-10-CM

## 2025-06-19 DIAGNOSIS — K52.9 INFLAMMATORY BOWEL DISEASE: ICD-10-CM

## 2025-06-19 DIAGNOSIS — C34.91 BRONCHOGENIC CANCER OF RIGHT LUNG (HCC): Primary | ICD-10-CM

## 2025-06-19 RX ORDER — PREDNISOLONE ACETATE 10 MG/ML
SUSPENSION/ DROPS OPHTHALMIC
COMMUNITY
Start: 2025-05-12

## 2025-06-19 RX ORDER — GABAPENTIN 100 MG/1
100 CAPSULE ORAL 2 TIMES DAILY
COMMUNITY

## 2025-06-19 NOTE — PROGRESS NOTES
The following individual(s) verbally consented to be recorded using ambient AI listening technology and understand that they can each withdraw their consent to this listening technology at any point by asking the clinician to turn off or pause the recording:    Patient name: Sarahi Osborne      Cancer Center Progress Note    Patient Name: Sarahi Osborne   YOB: 1950   Medical Record Number: [id number]   CSN: [id number]   Attending Physician: Dinorah Olson M.D.   Referring Physician: Dr Cally Mortensen    Date of Visit: 6/19/2025     Chief Complaint:  Chief Complaint   Patient presents with    Follow - Up     Carcinoid pt here for f/u and to review CT. Right sided pain was increasing, gabapentin per PCP, seem to be helping. Denies SOB, has chronic cough.         Oncologic History:  1. 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 AIS involved the parenchymal margin s/p navigational bronchoscopy, RVATs with RLL wedge resection and mediastinal node dissection on 2/14/22. As no definite invasion was found and probably an in situ lesion only after discussion with the patient a wedge resection was pursued. Given GG nature of the lesion nothing was palpated at surgery and navigation system with methylene blue was used to vargas the area of the lesion.     - presented with a persistent cough back in 2019. She was placed on medications for her asthma as well as a saline nebulizer and PPI but cough persisted. She was seen by pulmonary. PFTs showed restrictive pattern with no significant post bronchodilator response. CT chest was done which showed a 5 mm solid nodule in the LLL. Cough eventually improved. She had a f/u CT scan to reevaluate nodule previously seen. This was done 11/17/21 which showed several subcentimeter lung nodules described as stable but also showed a ground glass nodule in the RLL which had increased from 0.6 mm to 1.1 cm.      Her CT images were reviewed at lung conference and consensus was to proceed with a biopsy. Biopsy was performed 12/8/21 by IR. There was a delay in getting final results back as was sent to [facility] for consultation. Ultimately they felt findings showed atypical alveolar proliferation favoring well differentiated adenocarcinoma.    2. Diarrhea     - Underwent w/u for diarrhea with GI in [place]. W/u showed WBCs in stool and was placed on Flagyl. Had scopes 1/2024 which showed esophagitis, polyp in stomach, diverticulosis and hemorrhoids. Calprotectin came back elevated and was told she had colitis. As has reported allergy to Bentyl and prednisone (rash for both)  was placed on mesalamine. She said on this diarrhea has improved significantly. As part of ED w/u for diarrhea chromogranin was drawn which came back elevated at 379.      History of Present Illness:   History of Present Illness  Sarahi Osborne is a 74 year old female here for f/u.     Presented with sudden onset of blurred vision and pain in both eyes.Was diagnosed with iritis at [facility] and was prescribed steroid drops. Was then referred to [place] [facility] and diagnosed with uveitis. She is currently under the care of Dr. Gibson in [place] for her eye condition.    She also has been experiencing persistent symptoms of inflammatory bowel disease, including diarrhea and elevated calprotectin levels since March. She is on mesalamine and followed by GI.     She experiences a stinging pain on her right side, which worsens when lying down and affects her sleep. This pain intensified about two weeks ago. A mid-back, lower back, and right hip x-ray was performed, and she was started on gabapentin 100 mg BID, which is beginning to alleviate the pain and improve her sleep.     Mild MONTGOMERY and rare cough.  Appetite is good. No fevers.  Denies change in urinary habits, headaches, dizziness.          Past Medical History:  Past Medical History:    Colitis     COPD (chronic obstructive pulmonary disease) (HCC)    Eye inflammation    GERD (gastroesophageal reflux disease)    High blood pressure    High cholesterol    Hypertension    Nodule of lower lobe of right lung    Osteoarthritis       Past Surgical History:  Past Surgical History:   Procedure Laterality Date    Breast biopsy      Other      carpal tunnel    Total knee replacement         Family Medical History:  Family History   Problem Relation Age of Onset    Cancer Brother     Cancer Brother         colon       Gyne History:  OB History   No obstetric history on file.       Psychosocial History:  Social History     Socioeconomic History    Marital status:      Spouse name: Not on file    Number of children: Not on file    Years of education: Not on file    Highest education level: Not on file   Occupational History    Not on file   Tobacco Use    Smoking status: Never    Smokeless tobacco: Never   Vaping Use    Vaping status: Never Used   Substance and Sexual Activity    Alcohol use: Yes     Comment: maybe 1x monthly    Drug use: Never    Sexual activity: Not on file   Other Topics Concern    Not on file   Social History Narrative    Not on file     Social Drivers of Health     Food Insecurity: No Food Insecurity (2/10/2025)    Received from [facility]    Hunger Vital Sign     Worried About Running Out of Food in the Last Year: Never true     Ran Out of Food in the Last Year: Never true   Transportation Needs: No Transportation Needs (2/10/2025)    Received from [facility]    PRAPARE - Transportation     Lack of Transportation (Medical): No     Lack of Transportation (Non-Medical): No   Housing Stability: Unknown (2/10/2025)    Received from [facility]    Housing Stability Vital Sign     Unable to Pay for Housing in the Last Year: No     Number of Times Moved in the Last Year: Not on file     Homeless  in the Last Year: No       Allergies:   Allergies   Allergen Reactions    Amlodipine SWELLING    Prednisone HIVES    Lisinopril Coughing       Current Medications:    Current Outpatient Medications:     prednisoLONE 1 % Ophthalmic Suspension, instill 1 Drop by Ophthalmic route OU qid, Disp: , Rfl:     gabapentin 100 MG Oral Cap, Take 1 capsule (100 mg total) by mouth 2 (two) times daily., Disp: , Rfl:     mesalamine 1.2 g Oral Tab EC, Take 2 tablets (2.4 g total) by mouth in the morning and 2 tablets (2.4 g total) before bedtime., Disp: , Rfl:     FIBER ADULT GUMMIES OR, Take 1 tablet by mouth in the morning., Disp: , Rfl:     Multiple Vitamins-Minerals (HAIR SKIN & NAILS ADVANCED) Oral Tab, Take 1 tablet by mouth in the morning., Disp: , Rfl:     Esomeprazole Magnesium 20 MG Oral Capsule Delayed Release, , Disp: , Rfl:     rosuvastatin 20 MG Oral Tab, , Disp: , Rfl:     acetaminophen 500 MG Oral Tab, Take 1 tablet (500 mg total) by mouth every 4 (four) hours as needed., Disp: , Rfl:     Multiple Vitamin (MULTIVITAMINS OR), Take by mouth every morning., Disp: , Rfl:     Acidophilus/Pectin Oral Cap, Take 1 capsule by mouth in the morning., Disp: , Rfl:     albuterol 108 (90 Base) MCG/ACT Inhalation Aero Soln, Inhale into the lungs every 6 (six) hours as needed for Wheezing., Disp: , Rfl:     atenolol 25 MG Oral Tab, Take 1 tablet (25 mg total) by mouth in the morning., Disp: , Rfl:     ALPRAZolam 0.25 MG Oral Tab, Take 1 tablet (0.25 mg total) by mouth as needed in the morning and 1 tablet (0.25 mg total) as needed at noon and 1 tablet (0.25 mg total) as needed in the evening and 1 tablet (0.25 mg total) as needed before bedtime., Disp: , Rfl:     Review of Systems:  A 14-point ROS was done with pertinent positives and negative per the HPI    Vital Signs:  /89 (BP Location: Right arm, Patient Position: Sitting)   Pulse 71   Temp 97.6 °F (36.4 °C) (Temporal)   Resp 16   Ht 1.627 m (5' 4.06\")   Wt 84.6 kg  (186 lb 8 oz)   SpO2 97%   BMI 31.96 kg/m²       Physical Examination:  General: Patient is alert and oriented x 3, not in acute distress.  HEENT: EOMs intact. PERRL. Oropharynx is clear.   Neck: No JVD. No palpable lymphadenopathy. Neck is supple.  Chest: Incision sites right well healed. CTA  Heart: Regular rate and rhythm.   Abdomen: Soft, non tender with good bowel sounds.  Extremities: Pedal pulses are present. No edema.  Neurological: Grossly intact.   Lymphatics: There is no palpable lymphadenopathy throughout in the cervical, supraclavicular, axillary, or inguinal regions.  Psych/Depression: Mood and affect are appropriate.    Laboratory:  Outside labs reviewed.     Lab Results   Component Value Date    WBC 6.6 06/05/2025    RBC 4.57 06/05/2025    HGB 13.1 06/05/2025    HCT 42.4 06/05/2025    .0 06/05/2025    MCV 92.8 06/05/2025    MCH 28.7 06/05/2025    MCHC 30.9 (L) 06/05/2025    RDW 13.5 06/05/2025    NEPRELIM 3.99 06/05/2025    NEPERCENT 60.2 06/05/2025    LYPERCENT 29.3 06/05/2025    MOPERCENT 8.2 06/05/2025    EOPERCENT 0.9 06/05/2025    BAPERCENT 1.1 06/05/2025    NE 3.99 06/05/2025    LYMABS 1.94 06/05/2025    MOABSO 0.54 06/05/2025    EOABSO 0.06 06/05/2025    BAABSO 0.07 06/05/2025       Lab Component   Component Value Date/Time    Glucose 91 06/05/2025 0951    Glucose 98 11/30/2023 0936    Glucose 106 (H) 06/01/2023 1024    BUN 10 06/05/2025 0951    BUN 15 11/30/2023 0936    BUN 19 (H) 06/01/2023 1024    Creatinine 0.59 06/05/2025 0951    Creatinine 0.70 11/30/2023 0936    Creatinine 0.67 06/01/2023 1024    GFR, -American 101 06/02/2022 1033    GFR, -American 110 02/15/2022 0624    GFR, -American 107 01/26/2022 1636    GFR, Non- 88 06/02/2022 1033    GFR, Non- 96 02/15/2022 0624    GFR, Non- 93 01/26/2022 1636    Calcium, Total 9.4 06/05/2025 0951    Calcium, Total 9.3 11/30/2023 0936    Calcium, Total 9.0 06/01/2023  1024    Albumin 4.4 06/05/2025 0951    Albumin 3.5 11/30/2023 0936    Albumin 3.9 06/01/2023 1024    Sodium 142 06/05/2025 0951    Sodium 141 11/30/2023 0936    Sodium 141 06/01/2023 1024    Potassium 4.4 06/05/2025 0951    Potassium 4.3 11/30/2023 0936    Potassium 4.6 06/01/2023 1024    Chloride 107 06/05/2025 0951    Chloride 108 11/30/2023 0936    Chloride 111 06/01/2023 1024    CO2 27.0 06/05/2025 0951    CO2 32.0 11/30/2023 0936    CO2 27.0 06/01/2023 1024    Alkaline Phosphatase 32 (L) 06/05/2025 0951    Alkaline Phosphatase 41 (L) 11/30/2023 0936    Alkaline Phosphatase 41 (L) 06/01/2023 1024    AST 26 06/05/2025 0951    AST 15 11/30/2023 0936    AST 19 06/01/2023 1024    ALT 22 06/05/2025 0951    ALT 28 11/30/2023 0936    ALT 32 06/01/2023 1024    Bilirubin, Total 0.3 06/05/2025 0951    Bilirubin, Total 0.5 11/30/2023 0936    Bilirubin, Total 0.4 06/01/2023 1024    Total Protein 6.5 06/05/2025 0951    Total Protein 6.4 11/30/2023 0936    Total Protein 6.8 06/01/2023 1024      Latest Reference Range & Units 06/05/25 09:51   CHROMOGRANIN A 0.0 - 101.8 ng/mL 230.7 (H)   (H): Data is abnormally high  CHROMOGRANIN A  <93 ng/mL 379 High          Radiology:  PROCEDURE:  PET/CT GA68 NEUROENDOCRINE TUMOR (CPT=78815)     INDICATIONS:  K59.1 Functional diarrhea C7A.090 Malignant carcinoid tumor of lung (HCC)     COMPARISON:  [facility], PET STANDARD BODY SCAN (ONCOLOGY) (CPT=78815), 1/13/2022, 10:05 AM.     TECHNIQUE:  Gallium-68 Dotatate/home (Netspot) was injected IV, and whole-body images from Vertex to mid-thigh were obtained with concurrent CT scan for both anatomic localization as well as attenuation correction. Dose reduction techniques were used.     RADIOPHARMACEUTICAL:                5.9 mCi Ga-68 Netspot (Dotatate)     INJECTION TIME:                                   1135  SCAN TIME:                                             1239     FINDINGS:      HEAD/NECK:  Physiologic activity in all regions.      CHEST:  Physiologic activity in all regions.  Right lower lobe pulmonary wedge resection with scarring/atelectasis along the suture margin.     ABDOMEN/PELVIS:  Photopenic defect associated with few hepatic cysts.  Activity is otherwise physiologic in all regions.     MUSCULOSKELETAL:    Mild degenerative uptake of the cervicothoracic junction.  Activity is otherwise physiologic in all regions.                   Impression   CONCLUSION:     No evidence of residual/recurrent malignancy within the head/neck, chest, abdomen, or pelvis.        LOCATION:  [place]                  Dictated by (CST): David North MD on 1/03/2025 at 1:30 PM      Finalized by (CST): David North MD on 1/03/2025 at 1:39 PM       PROCEDURE:  CT CHEST(CONTRAST ONLY) (CPT=71260)     COMPARISON:  [facility], CT, CT CHEST(CONTRAST ONLY) (CPT=71260), 11/30/2023, 9:46 AM.     INDICATIONS:  C34.91 Bronchogenic cancer of right lung (HCC)     TECHNIQUE:  CT images were obtained with non-ionic intravenous contrast material. Dose reduction techniques were used. Dose information is transmitted to the ACR (American College of Radiology) NRDR (National Radiology Data Registry) which includes the  Dose Index Registry.     PATIENT STATED HISTORY:(As transcribed by Technologist)  Disease surveillance due to history of Lung Cancer.      CONTRAST USED:  75cc of Isovue 370     FINDINGS:    LUNGS:  There is no significant change in a 4-5 mm micro nodule of the right middle lobe on series 3, image 82. A 3-4 mm micro nodule of the left lower lobe on image 79 is also essentially unchanged.  Postsurgical changes of the chest are noted.  Motion  artifact slightly limits evaluation.  No new focal consolidation is seen.  No new nodules are identified.  VASCULATURE:  No visible pulmonary arterial thrombus or attenuation.    YONG:  No mass or adenopathy.    MEDIASTINUM:  No mass or adenopathy.    CARDIAC:  No enlargement, pericardial thickening, or  significant coronary artery calcification.  PLEURA:  No mass or effusion.    THORACIC AORTA:  Thoracic aorta measures up to 3.8 cm in diameter which is similar to the prior exam.  No discrete aneurysm is seen.  CHEST WALL:  No mass or axillary adenopathy.    LIMITED ABDOMEN:  Scattered probable cysts noted within the liver are essentially unchanged when compared with prior exams.  BONES:  Stable appearance of multilevel degenerative changes.                   Impression   CONCLUSION:  No significant change in small micro nodules measuring up to 5 mm for which continued follow-up is suggested.  No new nodules are identified.  No evidence of lymphadenopathy within the chest.        LOCATION:  [place]        Dictated by (CST): Carrington Doyle MD on 6/04/2024 at 11:13 AM      Finalized by (CST): Carrington Doyle MD on 6/04/2024 at 11:19 AM     PROCEDURE:  CT CHEST+ABDOMEN (ALL CNTRST ONLY) (CPT=71260/96307)     COMPARISON:  [facility], PET GA68 NEUROENDOCRINE TUMOR (CPT=78815), 1/03/2025, 12:37 PM.  [facility], CT, CT CHEST(CONTRAST ONLY) (CPT=71260), 6/04/2024, 10:09 AM.     INDICATIONS:  K59.1 Functional diarrhea C34.91 Bronchogenic cancer of right lung (HCC)     TECHNIQUE:  IV contrast-enhanced scanning was performed through the chest and abdomen.  Dose reduction techniques were used. Dose information is transmitted to the ACR (American College of Radiology) NRDR (National Radiology Data Registry) which includes   the Dose Index Registry.     PATIENT STATED HISTORY:(As transcribed by Technologist)  Disease surveillance due to history of lung cancer. Patient states she has had right sided abdomen pain since having lung lobectomy. Pain has becoam worse the past couple of weeks.      CONTRAST USED:  100cc of Omnipaque 350     FINDINGS:       CHEST:  LUNGS:  Stable 5 mm right middle lobe pulmonary nodule (series 3, image 92).  Stable 4 mm left lower lobe pulmonary nodule (series 3, image 83).  Stable 3 mm nodule  in the left upper lobe (series 3, image 64).  MEDIASTINUM:  Multinodular thyroid, similar to prior.  No new or enlarging mediastinal lymphadenopathy.  YONG:  No new or enlarging hilar lymphadenopathy.  CARDIAC:  No enlargement, pericardial thickening, or significant coronary artery calcification.  PLEURA:  No mass or effusion.    CHEST WALL:  No mass or axillary adenopathy.    AORTA:  Atherosclerotic disease without aneurysm  VASCULATURE:  No visible pulmonary arterial thrombus or attenuation.       ABDOMEN:  LIVER:  Multiple hepatic cysts, similar to prior.  No steatosis.  BILIARY:  No visible dilatation or calcification.    PANCREAS:  No lesion, fluid collection, ductal dilatation, or atrophy.    SPLEEN:  No enlargement or focal lesion.    KIDNEYS:  No mass, obstruction, or calcification.    ADRENALS:  No enlargement or mass.    AORTA:  Atherosclerotic disease without aneurysm  RETROPERITONEUM:  No adenopathy or mass.    BOWEL/MESENTERY:  No dilated bowel or wall thickening in the field of view  ABDOMINAL WALL:  No significant finding  BONES:  Degenerative changes of the spine.                  Impression  CONCLUSION:    1. Stable pulmonary nodules as described.  Continued attention on follow-up is recommended.  2. No evidence of new or progressive metastatic disease in the chest abdomen or pelvis        LOCATION:  [id number]        Dictated by (CST): Hilario Flores MD on 6/05/2025 at 4:11 PM      Finalized by (CST): Hilario Flores MD on 6/05/2025 at 4:22 PM      PROCEDURE:  CT CHEST(CONTRAST ONLY) (CPT=71260)     COMPARISON:  [facility], CT, CT CHEST(CONTRAST ONLY) (CPT=71260), 11/30/2023, 9:46 AM.     INDICATIONS:  C34.91 Bronchogenic cancer of right lung (HCC)     TECHNIQUE:  CT images were obtained with non-ionic intravenous contrast material. Dose reduction techniques were used. Dose information is transmitted to the ACR (American College of Radiology) NRDR (National Radiology Data Registry)  which includes the  Dose Index Registry.     PATIENT STATED HISTORY:(As transcribed by Technologist)  Disease surveillance due to history of Lung Cancer.      CONTRAST USED:  75cc of Isovue 370     FINDINGS:    LUNGS:  There is no significant change in a 4-5 mm micro nodule of the right middle lobe on series 3, image 82. A 3-4 mm micro nodule of the left lower lobe on image 79 is also essentially unchanged.  Postsurgical changes of the chest are noted.  Motion  artifact slightly limits evaluation.  No new focal consolidation is seen.  No new nodules are identified.  VASCULATURE:  No visible pulmonary arterial thrombus or attenuation.    YONG:  No mass or adenopathy.    MEDIASTINUM:  No mass or adenopathy.    CARDIAC:  No enlargement, pericardial thickening, or significant coronary artery calcification.  PLEURA:  No mass or effusion.    THORACIC AORTA:  Thoracic aorta measures up to 3.8 cm in diameter which is similar to the prior exam.  No discrete aneurysm is seen.  CHEST WALL:  No mass or axillary adenopathy.    LIMITED ABDOMEN:  Scattered probable cysts noted within the liver are essentially unchanged when compared with prior exams.  BONES:  Stable appearance of multilevel degenerative changes.                   Impression   CONCLUSION:  No significant change in small micro nodules measuring up to 5 mm for which continued follow-up is suggested.  No new nodules are identified.  No evidence of lymphadenopathy within the chest.        LOCATION:  [place]        Dictated by (CST): Carrington Doyle MD on 6/04/2024 at 11:13 AM      Finalized by (CST): Carrington Doyle MD on 6/04/2024 at 11:19 AM      Abdomen -- Computed Tomography     Impression    IMPRESSION:  1.  No evidence of acute process in the abdomen or pelvis. No specific findings on this study to account for the patient's symptoms.  2.  Chronic findings as above, including: Radiologically uncomplicated sigmoid diverticulosis. Hepatic cysts. Osseous degenerative  changes.  Narrative    DICTATING PHYSICIAN:  Aren Mohan M.D.    EXAM:   CT ABDOMEN PELVIS W/ CONTRAST    INDICATION: Diarrhea past four months, history of lung cancer    PROCEDURE: CT of the abdomen and pelvis was obtained following administration of 80 cc Isovue 300 IV contrast. Oral contrast was utilized. Radimetrics Dose Report: Radiation dose reduction techniques were employed. CTDIvol: 12.1 - 16.4 mGy. DLP: 1384 mGy-cm.    COMPARISON: CT chest 6/1/2023 (images only, report for this study is not available at this time), right upper quadrant ultrasound 10/18/2017    FINDINGS:    Lower chest: Subsegmental atelectasis/scarring in the lung bases. Mitral annular calcifications.    ABDOMEN:  Liver: Several hepatic cysts, measuring up 5.0 cm in the posterior medial right lobe. Additional subcentimeter hepatic lesions which are two small to characterize but also most likely cysts and/or hemangiomas. No suspicious lesions.  Gallbladder: No significant CT abnormality. No pathologic biliary dilation.  Spleen: No significant abnormality.  Pancreas: No significant abnormality.  Adrenals: No significant abnormality.  Kidneys: No significant abnormality.  Aorta: Mild atherosclerosis.  IVC: No significant abnormality.  Retroperitoneum/Lymph nodes: No fluid collections in the retroperitoneum. No pathologically enlarged lymph nodes in the abdomen.  Stomach, duodenum, and visualized esophagus: No significant abnormality.    PELVIS:  Small bowel: No significant abnormality.  Appendix: The appendix is identified in the right lower quadrant, normal in appearance.  Colon: There is diverticulosis of the sigmoid colon without associated fat stranding or fluid to suggest diverticulitis.  Mesentery: No significant abnormality.  Peritoneum: No free fluid or air.  Reproductive organs: No significant abnormality.  Bladder: Incompletely distended, compromising evaluation.  Lymph nodes: No pathologically enlarged lymph nodes in the  pelvis.  Body wall: No significant abnormality.    Bones: Degenerative changes of the dorsal spine and hips. Occasional bone island. No destructive lesions.      Impression & Plan:  Assessment & Plan  1. AIS RLL- s/p resection with incidental carcinoid tumorlet seen. Very early stage lesion with no evidence of invasion. Discussed at lung conference- though in situ carcinoma at parenchymal margin, as lesion non-invasive no further surgery indicated and will monitor closely. CT just done showed no overt evidence of recurrence with stable findings.     2. Incidental carcinoid tumorlet- found after resection. Ongoing diarrhea from colitis unlikely from carcinoid syndrome but given history checked chromogranin which came back elevated. Dotate (neuroendocrine) PET showed no evidence of malignancy. Repeat chromogranin decreased though still elevated. I suspect from non-malignant causes. She is on a PPI and more importantly with inflammatory conditions that which can increase levels. As not helpful from Onc standpoint for surveillance  with negative PET we will not draw chromogranin levels going forward.     3. Lung nodules- will monitor with surveillance scans. Stable as well with no new nodules seen.     4. 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 Monitor    5. Diarrhea-extensive w/u by GI showed elevated calprotectin and felt to have inflammatory colitis. On mesalamine.     6. Uveitis- on steroid drops per Ophth with continued follow up    7. Right sided pain, post thoracotomy- exacerbated by lying down. Gabapentin 100 mg BID trial showed symptom improvement and better sleep.       Reviewed scans and labs with her and  in detail.       Scans yearly  See me 6 months      RTC 6 months.     Dinorah Olson MD  [facility]

## (undated) DEVICE — Device: Brand: VPAD2 PATIENT TRACKER

## (undated) DEVICE — SCD SLEEVE KNEE HI BLEND

## (undated) DEVICE — MEDI-VAC NON-CONDUCTIVE SUCTION TUBING: Brand: CARDINAL HEALTH

## (undated) DEVICE — THE ECHELON, ECHELON ENDOPATH™ AND ECHELON FLEX™ FAMILIES OF ENDOSCOPIC LINEAR CUTTERS AND RELOADS ARE STERILE, SINGLE PATIENT USE INSTRUMENTS THAT SIMULTANEOUSLY CUT AND STAPLE TISSUE. THERE ARE SIX STAGGERED ROWS OF STAPLES, THREE ON EITHER SIDE OF THE CUT LINE. THE 45 MM INSTRUMENTS HAVE A STAPLE LINE THATIS APPROXIMATELY 45 MM LONG AND A CUT LINE THAT IS APPROXIMATELY 42 MM LONG. THE SHAFT CAN ROTATE FREELY IN BOTH DIRECTIONS AND AN ARTICULATION MECHANISM ON ARTICULATING INSTRUMENTS ENABLES BENDING THE DISTAL PORTIONOF THE SHAFT TO FACILITATE LATERAL ACCESS OF THE OPERATIVE SITE.THE INSTRUMENTS ARE SHIPPED WITHOUT A RELOAD AND MUST BE LOADED PRIOR TO USE. A STAPLE RETAINING CAP ON THE RELOAD PROTECTS THE STAPLE LEG POINTS DURING SHIPPING AND TRANSPORTATION. THE INSTRUMENTS’ LOCK-OUT FEATURE IS DESIGNED TO PREVENT A USED RELOAD FROM BEING REFIRED.: Brand: ECHELON ENDOPATH

## (undated) DEVICE — SOL H2O 1000ML BTL

## (undated) DEVICE — EXOFIN TISSUE ADHESIVE 1.0ML

## (undated) DEVICE — Device

## (undated) DEVICE — THE ECHELON FLEX POWERED PLUS ARTICULATING ENDOSCOPIC LINEAR CUTTERS ARE STERILE, SINGLE PATIENT USE INSTRUMENTS THAT SIMULTANEOUSLYCUT AND STAPLE TISSUE. THERE ARE SIX STAGGERED ROWS OF STAPLES, THREE ON EITHER SIDE OF THE CUT LINE. THE ECHELON FLEX 45 POWERED PLUSINSTRUMENTS HAVE A STAPLE LINE THAT IS APPROXIMATELY 45 MM LONG AND A CUT LINE THAT IS APPROXIMATELY 42 MM LONG. THE SHAFT CAN ROTATE FREELYIN BOTH DIRECTIONS AND AN ARTICULATION MECHANISM ENABLES THE DISTAL PORTION OF THE SHAFT TO PIVOT TO FACILITATE LATERAL ACCESS TO THE OPERATIVESITE.THE INSTRUMENTS ARE PACKAGED WITH A PRIMARY LITHIUM BATTERY PACK THAT MUST BE INSTALLED PRIOR TO USE. THERE ARE SPECIFIC REQUIREMENTS FORDISPOSING OF THE BATTERY PACK. REFER TO THE BATTERY PACK DISPOSAL SECTION.THE INSTRUMENTS ARE PACKAGED WITHOUT A RELOAD AND MUST BE LOADED PRIOR TO USE. A STAPLE RETAINING CAP ON THE RELOAD PROTECTS THE STAPLE LEGPOINTS DURING SHIPPING AND TRANSPORTATION. THE INSTRUMENTS’ LOCK-OUT FEATURE IS DESIGNED TO PREVENT A USED OR IMPROPERLY INSTALLED RELOADFROM BEING REFIRED OR AN INSTRUMENT FROM BEING FIRED WITHOUT A RELOAD.: Brand: ECHELON FLEX

## (undated) DEVICE — GLOVE SURG TRIUMPH SZ 8

## (undated) DEVICE — SOLUTION ENDOSCOPIC ANTI-FOG NON-TOXIC NON-ABRASIVE 6 CUBIC CENTIMETER WITH RADIOPAQUE ADHESIVE-BACKED SPONGE STERILE NOT MADE WITH NATURAL RUBBER LATEX MEDICHOICE: Brand: MEDICHOICE

## (undated) DEVICE — CV PACK-LF: Brand: MEDLINE INDUSTRIES, INC.

## (undated) DEVICE — SUTURE SILK 0 FSL

## (undated) DEVICE — GAUZE SPONGES,USP TYPE VII GAUZE, 12 PLY: Brand: CURITY

## (undated) DEVICE — CATH DRAIN 24F HYDRAGLIDE XL

## (undated) DEVICE — ABSORBABLE HEMOSTAT (OXIDIZED REGENERATED CELLULOSE, U.S.P.): Brand: SURGICEL

## (undated) DEVICE — SUTURE VICRYL 2-0 CT-1

## (undated) DEVICE — WOUND RETRACTOR AND PROTECTOR: Brand: ALEXIS WOUND PROTECTOR-RETRACTOR

## (undated) DEVICE — STANDARD HYPODERMIC NEEDLE,POLYPROPYLENE HUB: Brand: MONOJECT

## (undated) DEVICE — BLADE ELECTRODE: Brand: EDGE

## (undated) DEVICE — NON-ADHERENT PAD PREPACK: Brand: TELFA

## (undated) DEVICE — ARYGLE SUCTION CATHETER WITH CHIMNEY VALVE STRIAGHT PACKED 14 FR/ CH: Brand: ARGYLE

## (undated) DEVICE — SYRINGE 10ML LL TIP

## (undated) DEVICE — 3M™ IOBAN™ 2 ANTIMICROBIAL INCISE DRAPE 6650EZ: Brand: IOBAN™ 2

## (undated) DEVICE — DRAIN CHEST DRY ADULT/PED

## (undated) DEVICE — STERILE POLYISOPRENE POWDER-FREE SURGICAL GLOVES: Brand: PROTEXIS

## (undated) DEVICE — SUTURE MONOCRYL 4-0 PS-2

## (undated) DEVICE — ANCHOR TISSUE RETRIEVAL SYSTEM, BAG SIZE 175 ML, PORT SIZE 10 MM: Brand: ANCHOR TISSUE RETRIEVAL SYSTEM

## (undated) NOTE — LETTER
Patient Name: Cora Cueva  : 10/8/1950  MRN: [id number]  Patient Address: [address] [place] [address]      Coronavirus Disease 2019 (COVID-19)     [facility] is committed to the safety and well-being of our patients, members, employees your symptoms get worse, call your healthcare provider immediately. 3. Get rest and stay hydrated.    4. If you have a medical appointment, call the healthcare provider ahead of time and tell them that you have or may have COVID-19.  5. For medical emergen fever-reducing medications; and  · Improvement in respiratory symptoms (e.g., cough, shortness of breath); and  · At least 10 days have passed since symptoms first appeared OR if asymptomatic patient or date of symptom onset is unclear then use 10 days pos donors must:    · Have had a confirmed diagnosis of COVID-19  · Be symptom-free for at least 14 days*    *Some people will be required to have a repeat COVID-19 test in order to be eligible to donate.  If you’re instructed by [facility] that a repeat test is r random. Researchers are trying to identify similarities between people with a Post-COVID condition to better understand if there are risk factors. How do I prevent a Post-COVID condition?   The best way to prevent the long-term symptoms of COVID-19 is